# Patient Record
Sex: MALE | Race: WHITE | NOT HISPANIC OR LATINO | Employment: UNEMPLOYED | URBAN - NONMETROPOLITAN AREA
[De-identification: names, ages, dates, MRNs, and addresses within clinical notes are randomized per-mention and may not be internally consistent; named-entity substitution may affect disease eponyms.]

---
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
## 2020-01-03 ENCOUNTER — OFFICE VISIT (OUTPATIENT)
Dept: FAMILY MEDICINE CLINIC | Facility: CLINIC | Age: 20
End: 2020-01-03

## 2020-01-03 VITALS
DIASTOLIC BLOOD PRESSURE: 62 MMHG | BODY MASS INDEX: 26.22 KG/M2 | SYSTOLIC BLOOD PRESSURE: 100 MMHG | OXYGEN SATURATION: 97 % | HEIGHT: 69 IN | HEART RATE: 71 BPM | WEIGHT: 177 LBS

## 2020-01-03 DIAGNOSIS — R51.9 PERSISTENT HEADACHES: ICD-10-CM

## 2020-01-03 DIAGNOSIS — J30.9 ALLERGIC RHINITIS, UNSPECIFIED SEASONALITY, UNSPECIFIED TRIGGER: Primary | ICD-10-CM

## 2020-01-03 DIAGNOSIS — R41.3 MEMORY IMPAIRMENT: ICD-10-CM

## 2020-01-03 PROCEDURE — 99213 OFFICE O/P EST LOW 20 MIN: CPT | Performed by: NURSE PRACTITIONER

## 2020-01-06 PROBLEM — J30.9 ALLERGIC RHINITIS: Status: ACTIVE | Noted: 2020-01-06

## 2020-01-06 PROBLEM — R41.3 MEMORY IMPAIRMENT: Status: ACTIVE | Noted: 2020-01-06

## 2020-01-06 PROBLEM — R51.9 PERSISTENT HEADACHES: Status: ACTIVE | Noted: 2020-01-06

## 2020-01-06 NOTE — PROGRESS NOTES
"Subjective   Nigel Escobar is a 19 y.o. male who presents to the office complaining of nasal congestion and sore throat that is worse in the mornings.    Continues to attend college at [employer], will go back on the 13th of this month.  Admits that he's having trouble remembering things that have happened, says he has \"trouble putting them in order.\"  Does not remember if something happened the day before or a month ago.  Headaches are sporadic.  Started having episodes of \"his brain hurting\" last year.    Living with his aunt since he was very young.    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  History of Present Illness     The following portions of the patient's history were reviewed and updated as appropriate: allergies, current medications, past family history, past medical history, past social history, past surgical history and problem list.    Review of Systems   Constitutional: Negative for chills, fatigue and fever.   HENT: Positive for congestion and sore throat. Negative for sneezing and trouble swallowing.    Eyes: Negative for visual disturbance.   Respiratory: Negative for cough, chest tightness, shortness of breath and wheezing.    Cardiovascular: Negative for chest pain, palpitations and leg swelling.   Gastrointestinal: Negative for abdominal pain, constipation, diarrhea, nausea and vomiting.   Genitourinary: Negative for dysuria, frequency and urgency.   Musculoskeletal: Negative for neck pain.   Skin: Negative for rash.   Neurological: Positive for headaches. Negative for dizziness and weakness.   Psychiatric/Behavioral:        In the past two weeks the pt has not felt down, depressed, hopeless or lost interest in doing things   All other systems reviewed and are negative.      Objective "   Physical Exam   Constitutional: He is oriented to person, place, and time. He appears well-developed and well-nourished. He is cooperative.  Non-toxic appearance. He has a sickly appearance. No distress.   HENT:   Head: Normocephalic and atraumatic.   Right Ear: Tympanic membrane and external ear normal.   Left Ear: Tympanic membrane and external ear normal.   Nose: Mucosal edema and rhinorrhea present.   Mouth/Throat: Uvula is midline, oropharynx is clear and moist and mucous membranes are normal.   Eyes: Pupils are equal, round, and reactive to light. Conjunctivae, EOM and lids are normal. Right eye exhibits no discharge. Left eye exhibits no discharge. No scleral icterus.   Neck: Trachea normal, normal range of motion and phonation normal. Neck supple. No thyromegaly present.   Cardiovascular: Normal rate, regular rhythm, normal heart sounds and intact distal pulses. Exam reveals no gallop and no friction rub.   No murmur heard.  Pulmonary/Chest: Effort normal and breath sounds normal. No respiratory distress. He has no wheezes. He has no rales.   Abdominal: Soft. Bowel sounds are normal. He exhibits no distension. There is no tenderness. There is no rebound and no guarding.   Musculoskeletal: Normal range of motion. He exhibits no edema.   Lymphadenopathy:     He has no cervical adenopathy.   Neurological: He is alert and oriented to person, place, and time. No cranial nerve deficit. GCS eye subscore is 4. GCS verbal subscore is 5. GCS motor subscore is 6.   Skin: Skin is warm, dry and intact. Capillary refill takes less than 2 seconds. No rash noted.   Psychiatric: He has a normal mood and affect. His speech is normal and behavior is normal. Judgment and thought content normal. His mood appears not anxious. He does not exhibit a depressed mood. He expresses no homicidal and no suicidal ideation. He expresses no suicidal plans and no homicidal plans.   Nursing note and vitals reviewed.      Assessment/Plan    Nigel was seen today for nasal congestion and sore throat.    Diagnoses and all orders for this visit:    Allergic rhinitis, unspecified seasonality, unspecified trigger    Persistent headaches  -     CT Head Without Contrast; Future    Memory impairment  -     CT Head Without Contrast; Future    Other orders  -     loratadine-pseudoephedrine (LORATADINE-D 12HR) 5-120 MG per 12 hr tablet; Take 1 tablet by mouth Every Morning.    Encouraged medication as ordered.  Will obtain CT scan to r/o any pathology regarding his memory issues.

## 2020-06-01 DIAGNOSIS — F41.9 ANXIETY: ICD-10-CM

## 2020-06-01 RX ORDER — VENLAFAXINE HYDROCHLORIDE 75 MG/1
CAPSULE, EXTENDED RELEASE ORAL
Qty: 90 CAPSULE | Refills: 1 | Status: SHIPPED | OUTPATIENT
Start: 2020-06-01 | End: 2020-08-11 | Stop reason: SDUPTHER

## 2020-07-07 DIAGNOSIS — R00.2 PALPITATIONS: Primary | ICD-10-CM

## 2020-08-11 ENCOUNTER — OFFICE VISIT (OUTPATIENT)
Dept: FAMILY MEDICINE CLINIC | Facility: CLINIC | Age: 20
End: 2020-08-11

## 2020-08-11 VITALS
HEART RATE: 97 BPM | SYSTOLIC BLOOD PRESSURE: 110 MMHG | TEMPERATURE: 97.9 F | BODY MASS INDEX: 28.14 KG/M2 | HEIGHT: 69 IN | DIASTOLIC BLOOD PRESSURE: 70 MMHG | WEIGHT: 190 LBS | OXYGEN SATURATION: 99 %

## 2020-08-11 DIAGNOSIS — H57.89 RED EYE: ICD-10-CM

## 2020-08-11 DIAGNOSIS — F41.9 ANXIETY: ICD-10-CM
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
## 2020-08-11 PROCEDURE — 99213 OFFICE O/P EST LOW 20 MIN: CPT | Performed by: NURSE PRACTITIONER

## 2020-08-11 RX ORDER — LORATADINE 10 MG/1
10 TABLET ORAL DAILY
Qty: 30 TABLET | Refills: 5 | Status: SHIPPED | OUTPATIENT
Start: 2020-08-11 | End: 2022-04-22

## 2020-08-11 RX ORDER — VENLAFAXINE HYDROCHLORIDE 75 MG/1
CAPSULE, EXTENDED RELEASE ORAL
Qty: 90 CAPSULE | Refills: 3 | Status: SHIPPED | OUTPATIENT
Start: 2020-08-11 | End: 2021-06-21 | Stop reason: SDUPTHER
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
## 2020-08-30 PROBLEM — H57.89 RED EYE: Status: ACTIVE | Noted: 2020-08-30

## 2020-08-31 NOTE — PROGRESS NOTES
Subjective   Nigel Escobar is a 19 y.o. male who presents to the office complaining of rash from a spider bite on his RIGHT leg.  Is also having red eyes at times without eye discharge, itching or pain.  Will need a refill of Effexor that he takes for anxiety as he can tell a lessening of his anxiety.  Continues to attend [employer].  History of Present Illness     The following portions of the patient's history were reviewed and updated as appropriate: allergies, current medications, past family history, past medical history, past social history, past surgical history and problem list.    Review of Systems   Constitutional: Negative for chills, fatigue and fever.   HENT: Negative for congestion, sneezing, sore throat and trouble swallowing.    Eyes: Positive for redness. Negative for visual disturbance.   Respiratory: Negative for cough, chest tightness, shortness of breath and wheezing.    Cardiovascular: Negative for chest pain, palpitations and leg swelling.   Gastrointestinal: Negative for abdominal pain, constipation, diarrhea, nausea and vomiting.   Genitourinary: Negative for dysuria, frequency and urgency.   Musculoskeletal: Negative for neck pain.   Skin: Positive for rash.   Neurological: Negative for dizziness, weakness and headaches.   Psychiatric/Behavioral: The patient is nervous/anxious.         In the past two weeks the pt has not felt down, depressed, hopeless or lost interest in doing things   All other systems reviewed and are negative.      Objective   Physical Exam   Constitutional: He is oriented to person, place, and time. He appears well-developed and well-nourished. He is cooperative.  Non-toxic appearance. No distress.   HENT:   Head: Normocephalic and atraumatic.   Right Ear: External ear normal.   Left Ear: External ear normal.   Nose: Nose normal.   Mouth/Throat: Oropharynx is clear and moist.   Eyes: Pupils are equal, round, and reactive to light. Conjunctivae, EOM and lids are  normal. Right eye exhibits no discharge. Left eye exhibits no discharge. No scleral icterus.   Bilat red eyes   Neck: Normal range of motion. Neck supple. No thyromegaly present.   Cardiovascular: Normal rate, regular rhythm, normal heart sounds and intact distal pulses. Exam reveals no gallop and no friction rub.   No murmur heard.  Pulmonary/Chest: Effort normal and breath sounds normal. No respiratory distress. He has no wheezes. He has no rales.   Abdominal: Soft. Bowel sounds are normal. He exhibits no distension. There is no tenderness. There is no rebound and no guarding.   Musculoskeletal: Normal range of motion. He exhibits no edema.   Lymphadenopathy:     He has no cervical adenopathy.   Neurological: He is alert and oriented to person, place, and time. No cranial nerve deficit. GCS eye subscore is 4. GCS verbal subscore is 5. GCS motor subscore is 6.   Skin: Skin is warm, dry and intact. Capillary refill takes less than 2 seconds. No rash noted.   Psychiatric: He has a normal mood and affect. His speech is normal and behavior is normal. Judgment and thought content normal. His mood appears not anxious. Cognition and memory are normal. He does not exhibit a depressed mood. He expresses no homicidal and no suicidal ideation. He expresses no suicidal plans and no homicidal plans.   Nursing note and vitals reviewed.      Assessment/Plan   Nigel was seen today for rash and anxiety.    Diagnoses and all orders for this visit:    Insect bite, unspecified site, initial encounter    Anxiety  -     venlafaxine XR (EFFEXOR-XR) 75 MG 24 hr capsule; TAKE 1 CAPSULE BY MOUTH DAILY    Red eye    Other orders  -     loratadine (CLARITIN) 10 MG tablet; Take 1 tablet by mouth Daily.  -     hydrocortisone 2.5 % ointment; Apply  topically to the appropriate area as directed 3 (Three) Times a Day.    Encouraged to continue with medications as ordered.  Good handwashing.  Use Visine Red Eye prn.

## 2021-05-03 RX ORDER — ALBUTEROL SULFATE 90 UG/1
2 AEROSOL, METERED RESPIRATORY (INHALATION)
Qty: 18 G | Refills: 2 | Status: SHIPPED | OUTPATIENT
Start: 2021-05-03

## 2021-06-11 DIAGNOSIS — Z00.00 ADULT GENERAL MEDICAL EXAM: Primary | ICD-10-CM

## 2021-06-17 ENCOUNTER — LAB (OUTPATIENT)
Dept: LAB | Facility: OTHER | Age: 21
End: 2021-06-17

## 2021-06-17 DIAGNOSIS — Z00.00 ADULT GENERAL MEDICAL EXAM: ICD-10-CM

## 2021-06-17 LAB
ALBUMIN SERPL-MCNC: 4.6 G/DL (ref 3.5–5)
ALBUMIN/GLOB SERPL: 1.4 G/DL (ref 1.1–1.8)
ALP SERPL-CCNC: 88 U/L (ref 38–126)
ALT SERPL W P-5'-P-CCNC: 79 U/L
ANION GAP SERPL CALCULATED.3IONS-SCNC: 7 MMOL/L (ref 5–15)
AST SERPL-CCNC: 40 U/L (ref 17–59)
BASOPHILS # BLD AUTO: 0.05 10*3/MM3 (ref 0–0.2)
BASOPHILS NFR BLD AUTO: 0.6 % (ref 0–1.5)
BILIRUB SERPL-MCNC: 1 MG/DL (ref 0.2–1.3)
BUN SERPL-MCNC: 16 MG/DL (ref 7–23)
BUN/CREAT SERPL: 15.8 (ref 7–25)
CALCIUM SPEC-SCNC: 9.8 MG/DL (ref 8.4–10.2)
CHLORIDE SERPL-SCNC: 102 MMOL/L (ref 101–112)
CHOLEST SERPL-MCNC: 165 MG/DL (ref 150–200)
CO2 SERPL-SCNC: 31 MMOL/L (ref 22–30)
CREAT SERPL-MCNC: 1.01 MG/DL (ref 0.7–1.3)
DEPRECATED RDW RBC AUTO: 36.7 FL (ref 37–54)
EOSINOPHIL # BLD AUTO: 0.15 10*3/MM3 (ref 0–0.4)
EOSINOPHIL NFR BLD AUTO: 1.7 % (ref 0.3–6.2)
ERYTHROCYTE [DISTWIDTH] IN BLOOD BY AUTOMATED COUNT: 12.5 % (ref 12.3–15.4)
GFR SERPL CREATININE-BSD FRML MDRD: 94 ML/MIN/1.73 (ref 77–179)
GLOBULIN UR ELPH-MCNC: 3.2 GM/DL (ref 2.3–3.5)
GLUCOSE SERPL-MCNC: 96 MG/DL (ref 70–99)
HCT VFR BLD AUTO: 46 % (ref 37.5–51)
HDLC SERPL-MCNC: 59 MG/DL (ref 40–59)
HGB BLD-MCNC: 16.1 G/DL (ref 13–17.7)
LDLC SERPL CALC-MCNC: 84 MG/DL
LDLC/HDLC SERPL: 1.38 {RATIO} (ref 0–3.55)
LYMPHOCYTES # BLD AUTO: 2.79 10*3/MM3 (ref 0.7–3.1)
LYMPHOCYTES NFR BLD AUTO: 31.6 % (ref 19.6–45.3)
MCH RBC QN AUTO: 28.6 PG (ref 26.6–33)
MCHC RBC AUTO-ENTMCNC: 35 G/DL (ref 31.5–35.7)
MCV RBC AUTO: 81.7 FL (ref 79–97)
MONOCYTES # BLD AUTO: 0.96 10*3/MM3 (ref 0.1–0.9)
MONOCYTES NFR BLD AUTO: 10.9 % (ref 5–12)
NEUTROPHILS NFR BLD AUTO: 4.87 10*3/MM3 (ref 1.7–7)
NEUTROPHILS NFR BLD AUTO: 55.2 % (ref 42.7–76)
PLATELET # BLD AUTO: 419 10*3/MM3 (ref 140–450)
PMV BLD AUTO: 9.4 FL (ref 6–12)
POTASSIUM SERPL-SCNC: 4 MMOL/L (ref 3.4–5)
PROT SERPL-MCNC: 7.8 G/DL (ref 6.3–8.6)
RBC # BLD AUTO: 5.63 10*6/MM3 (ref 4.14–5.8)
SODIUM SERPL-SCNC: 140 MMOL/L (ref 137–145)
T4 FREE SERPL-MCNC: 1.35 NG/DL (ref 0.93–1.7)
TRIGL SERPL-MCNC: 122 MG/DL
TSH SERPL DL<=0.05 MIU/L-ACNC: 2.69 UIU/ML (ref 0.27–4.2)
VLDLC SERPL-MCNC: 22 MG/DL (ref 5–40)
WBC # BLD AUTO: 8.82 10*3/MM3 (ref 3.4–10.8)

## 2021-06-17 PROCEDURE — 84443 ASSAY THYROID STIM HORMONE: CPT | Performed by: NURSE PRACTITIONER

## 2021-06-17 PROCEDURE — 85025 COMPLETE CBC W/AUTO DIFF WBC: CPT | Performed by: NURSE PRACTITIONER

## 2021-06-17 PROCEDURE — 80061 LIPID PANEL: CPT | Performed by: NURSE PRACTITIONER

## 2021-06-17 PROCEDURE — 84439 ASSAY OF FREE THYROXINE: CPT | Performed by: NURSE PRACTITIONER

## 2021-06-17 PROCEDURE — 80053 COMPREHEN METABOLIC PANEL: CPT | Performed by: NURSE PRACTITIONER

## 2021-06-21 DIAGNOSIS — R74.8 ELEVATED LIVER ENZYMES: Primary | ICD-10-CM

## 2021-06-21 DIAGNOSIS — F41.9 ANXIETY: ICD-10-CM

## 2021-06-22 RX ORDER — VENLAFAXINE HYDROCHLORIDE 75 MG/1
CAPSULE, EXTENDED RELEASE ORAL
Qty: 90 CAPSULE | Refills: 3 | Status: SHIPPED | OUTPATIENT
Start: 2021-06-22 | End: 2022-04-22

## 2021-08-09 ENCOUNTER — LAB (OUTPATIENT)
Dept: LAB | Facility: OTHER | Age: 21
End: 2021-08-09

## 2021-08-09 DIAGNOSIS — R74.8 ELEVATED LIVER ENZYMES: ICD-10-CM

## 2021-08-09 LAB
ALBUMIN SERPL-MCNC: 4.4 G/DL (ref 3.5–5)
ALBUMIN/GLOB SERPL: 1.5 G/DL (ref 1.1–1.8)
ALP SERPL-CCNC: 79 U/L (ref 38–126)
ALT SERPL W P-5'-P-CCNC: 80 U/L
ANION GAP SERPL CALCULATED.3IONS-SCNC: 5 MMOL/L (ref 5–15)
AST SERPL-CCNC: 42 U/L (ref 17–59)
BILIRUB SERPL-MCNC: 0.7 MG/DL (ref 0.2–1.3)
BUN SERPL-MCNC: 15 MG/DL (ref 7–23)
BUN/CREAT SERPL: 15.8 (ref 7–25)
CALCIUM SPEC-SCNC: 9.5 MG/DL (ref 8.4–10.2)
CHLORIDE SERPL-SCNC: 105 MMOL/L (ref 101–112)
CO2 SERPL-SCNC: 29 MMOL/L (ref 22–30)
CREAT SERPL-MCNC: 0.95 MG/DL (ref 0.7–1.3)
GFR SERPL CREATININE-BSD FRML MDRD: 101 ML/MIN/1.73 (ref 77–179)
GLOBULIN UR ELPH-MCNC: 3 GM/DL (ref 2.3–3.5)
GLUCOSE SERPL-MCNC: 93 MG/DL (ref 70–99)
POTASSIUM SERPL-SCNC: 4.4 MMOL/L (ref 3.4–5)
PROT SERPL-MCNC: 7.4 G/DL (ref 6.3–8.6)
SODIUM SERPL-SCNC: 139 MMOL/L (ref 137–145)

## 2021-08-09 PROCEDURE — 80053 COMPREHEN METABOLIC PANEL: CPT | Performed by: NURSE PRACTITIONER

## 2021-08-30 DIAGNOSIS — R74.01 ELEVATED ALT MEASUREMENT: Primary | ICD-10-CM

## 2021-09-03 ENCOUNTER — LAB (OUTPATIENT)
Dept: LAB | Facility: OTHER | Age: 21
End: 2021-09-03

## 2021-09-03 DIAGNOSIS — R74.01 ELEVATED ALT MEASUREMENT: ICD-10-CM

## 2021-09-03 LAB
ALBUMIN SERPL-MCNC: 4.3 G/DL (ref 3.5–5)
ALP SERPL-CCNC: 77 U/L (ref 38–126)
ALT SERPL W P-5'-P-CCNC: 68 U/L
AST SERPL-CCNC: 41 U/L (ref 17–59)
BILIRUB CONJ SERPL-MCNC: <0 MG/DL (ref 0–0.3)
BILIRUB INDIRECT SERPL-MCNC: 0.4 MG/DL (ref 0–1.1)
BILIRUB SERPL-MCNC: 0.5 MG/DL (ref 0.2–1.3)
HAV IGM SERPL QL IA: NORMAL
HBV CORE IGM SERPL QL IA: NORMAL
HBV SURFACE AG SERPL QL IA: NORMAL
HCV AB SER DONR QL: NORMAL
PROT SERPL-MCNC: 7.3 G/DL (ref 6.3–8.6)

## 2021-09-03 PROCEDURE — 80076 HEPATIC FUNCTION PANEL: CPT | Performed by: NURSE PRACTITIONER

## 2021-09-03 PROCEDURE — 80074 ACUTE HEPATITIS PANEL: CPT | Performed by: NURSE PRACTITIONER

## 2022-03-23 ENCOUNTER — LAB (OUTPATIENT)
Dept: LAB | Facility: OTHER | Age: 22
End: 2022-03-23

## 2022-03-23 DIAGNOSIS — R74.8 ELEVATED LIVER ENZYMES: Primary | ICD-10-CM

## 2022-03-23 DIAGNOSIS — R74.8 ELEVATED LIVER ENZYMES: ICD-10-CM

## 2022-03-23 LAB
ALBUMIN SERPL-MCNC: 4.2 G/DL (ref 3.5–5)
ALBUMIN/GLOB SERPL: 1.4 G/DL (ref 1.1–1.8)
ALP SERPL-CCNC: 74 U/L (ref 38–126)
ALT SERPL W P-5'-P-CCNC: 145 U/L
ANION GAP SERPL CALCULATED.3IONS-SCNC: 6 MMOL/L (ref 5–15)
AST SERPL-CCNC: 79 U/L (ref 17–59)
BASOPHILS # BLD AUTO: 0.03 10*3/MM3 (ref 0–0.2)
BASOPHILS NFR BLD AUTO: 0.4 % (ref 0–1.5)
BILIRUB CONJ SERPL-MCNC: <0 MG/DL (ref 0–0.3)
BILIRUB SERPL-MCNC: 0.6 MG/DL (ref 0.2–1.3)
BILIRUB UR QL STRIP: NEGATIVE
BUN SERPL-MCNC: 14 MG/DL (ref 7–23)
BUN/CREAT SERPL: 15.9 (ref 7–25)
CALCIUM SPEC-SCNC: 9.2 MG/DL (ref 8.4–10.2)
CHLORIDE SERPL-SCNC: 102 MMOL/L (ref 101–112)
CLARITY UR: CLEAR
CO2 SERPL-SCNC: 30 MMOL/L (ref 22–30)
COLOR UR: YELLOW
CREAT SERPL-MCNC: 0.88 MG/DL (ref 0.7–1.3)
DEPRECATED RDW RBC AUTO: 37.9 FL (ref 37–54)
EGFRCR SERPLBLD CKD-EPI 2021: 125.5 ML/MIN/1.73
EOSINOPHIL # BLD AUTO: 0.16 10*3/MM3 (ref 0–0.4)
EOSINOPHIL NFR BLD AUTO: 2.3 % (ref 0.3–6.2)
ERYTHROCYTE [DISTWIDTH] IN BLOOD BY AUTOMATED COUNT: 12.9 % (ref 12.3–15.4)
GLOBULIN UR ELPH-MCNC: 3 GM/DL (ref 2.3–3.5)
GLUCOSE SERPL-MCNC: 91 MG/DL (ref 70–99)
GLUCOSE UR STRIP-MCNC: NEGATIVE MG/DL
HCT VFR BLD AUTO: 44.7 % (ref 37.5–51)
HGB BLD-MCNC: 15.3 G/DL (ref 13–17.7)
HGB UR QL STRIP.AUTO: NEGATIVE
KETONES UR QL STRIP: NEGATIVE
LEUKOCYTE ESTERASE UR QL STRIP.AUTO: NEGATIVE
LYMPHOCYTES # BLD AUTO: 2.17 10*3/MM3 (ref 0.7–3.1)
LYMPHOCYTES NFR BLD AUTO: 31.7 % (ref 19.6–45.3)
MCH RBC QN AUTO: 27.8 PG (ref 26.6–33)
MCHC RBC AUTO-ENTMCNC: 34.2 G/DL (ref 31.5–35.7)
MCV RBC AUTO: 81.3 FL (ref 79–97)
MONOCYTES # BLD AUTO: 0.78 10*3/MM3 (ref 0.1–0.9)
MONOCYTES NFR BLD AUTO: 11.4 % (ref 5–12)
NEUTROPHILS NFR BLD AUTO: 3.71 10*3/MM3 (ref 1.7–7)
NEUTROPHILS NFR BLD AUTO: 54.2 % (ref 42.7–76)
NITRITE UR QL STRIP: NEGATIVE
PH UR STRIP.AUTO: 5.5 [PH] (ref 5.5–8)
PLATELET # BLD AUTO: 374 10*3/MM3 (ref 140–450)
PMV BLD AUTO: 9.7 FL (ref 6–12)
POTASSIUM SERPL-SCNC: 4 MMOL/L (ref 3.4–5)
PROT SERPL-MCNC: 7.2 G/DL (ref 6.3–8.6)
PROT UR QL STRIP: NEGATIVE
RBC # BLD AUTO: 5.5 10*6/MM3 (ref 4.14–5.8)
SODIUM SERPL-SCNC: 138 MMOL/L (ref 137–145)
SP GR UR STRIP: 1.02 (ref 1–1.03)
UROBILINOGEN UR QL STRIP: NORMAL
WBC NRBC COR # BLD: 6.85 10*3/MM3 (ref 3.4–10.8)

## 2022-03-23 PROCEDURE — 80053 COMPREHEN METABOLIC PANEL: CPT | Performed by: NURSE PRACTITIONER

## 2022-03-23 PROCEDURE — 82248 BILIRUBIN DIRECT: CPT | Performed by: NURSE PRACTITIONER

## 2022-03-23 PROCEDURE — 85025 COMPLETE CBC W/AUTO DIFF WBC: CPT | Performed by: NURSE PRACTITIONER

## 2022-03-23 PROCEDURE — 81003 URINALYSIS AUTO W/O SCOPE: CPT | Performed by: NURSE PRACTITIONER

## 2022-04-04 DIAGNOSIS — R74.8 ELEVATED LIVER ENZYMES: Primary | ICD-10-CM

## 2022-04-22 ENCOUNTER — OFFICE VISIT (OUTPATIENT)
Dept: GASTROENTEROLOGY | Facility: CLINIC | Age: 22
End: 2022-04-22

## 2022-04-22 VITALS
HEIGHT: 69 IN | WEIGHT: 204.8 LBS | SYSTOLIC BLOOD PRESSURE: 110 MMHG | DIASTOLIC BLOOD PRESSURE: 70 MMHG | BODY MASS INDEX: 30.33 KG/M2 | HEART RATE: 108 BPM

## 2022-04-22 DIAGNOSIS — R79.89 ELEVATED LIVER FUNCTION TESTS: Primary | ICD-10-CM

## 2022-04-22 PROCEDURE — 99204 OFFICE O/P NEW MOD 45 MIN: CPT | Performed by: INTERNAL MEDICINE

## 2022-04-22 NOTE — PROGRESS NOTES
[facility]      Chief Complaint:   Chief Complaint   Patient presents with   • Elevated Hepatic Enzymes       Subjective     HPI:   Patient with new onset elevation of liver enzymes over the past year which have slightly worsened.  Patient has gained approximately 40 pounds over that period of time.  Patient denies any alcohol use.  Patient denies any abdominal pain nausea vomiting change in bowel habits.    Plan; will order ultrasound of the abdomen and lab work including CMP CBC hepatitis profile and liver work-up.  Let patient follow-up in 4 weeks    Past Medical History:   Past Medical History:   Diagnosis Date   • Acne vulgaris    • Allergic    • Asthma    • Constipation     unspecified   • Diarrhea    • Gastroesophageal reflux disease    • Generalized anxiety disorder    • Impacted cerumen    • Seborrheic dermatitis    • Upper respiratory infection        Past Surgical History:  Past Surgical History:   Procedure Laterality Date   • WISDOM TOOTH EXTRACTION  04/2019       Family History:  Family History   Problem Relation Age of Onset   • Diabetes Mother    • Depression Mother    • Bipolar disorder Mother    • Cancer Maternal Uncle         colon   • Cancer Paternal Aunt    • Developmental Disability Paternal Aunt    • Hypertension Paternal Aunt    • Diabetes Father    • Heart disease Maternal Grandmother    • Cancer Maternal Grandmother    • Heart disease Maternal Grandfather        Social History:   reports that he has never smoked. He has never used smokeless tobacco. He reports that he does not drink alcohol and does not use drugs.    Medications:   Prior to Admission medications    Medication Sig Start Date End Date Taking? Authorizing Provider   albuterol sulfate HFA (Ventolin HFA) 108 (90 Base) MCG/ACT inhaler Inhale 2 puffs 4 (Four) Times a Day.  Patient taking differently: Inhale 2 puffs 4 (Four) Times a Day As Needed. 5/3/21  Yes Aylin Gardner APRN   hydrocortisone 2.5 %  "ointment Apply  topically to the appropriate area as directed 3 (Three) Times a Day. 8/11/20 4/22/22  Aylin Gardner APRN   loratadine (CLARITIN) 10 MG tablet Take 1 tablet by mouth Daily. 8/11/20 4/22/22  yAlin Gardner APRN   loratadine-pseudoephedrine (LORATADINE-D 12HR) 5-120 MG per 12 hr tablet Take 1 tablet by mouth Every Morning. 1/3/20 4/22/22  Aylin Gardner APRN   venlafaxine XR (EFFEXOR-XR) 75 MG 24 hr capsule TAKE 1 CAPSULE BY MOUTH DAILY 6/22/21 4/22/22  Aylin Gardner APRN       Allergies:  Patient has no known allergies.    ROS:    Review of Systems   Constitutional: Negative for activity change, appetite change and unexpected weight change.   HENT: Negative for congestion, sore throat and trouble swallowing.    Respiratory: Negative for cough, choking and shortness of breath.    Cardiovascular: Negative for chest pain.   Gastrointestinal: Negative for abdominal distention, abdominal pain, anal bleeding, blood in stool, constipation, diarrhea, nausea, rectal pain and vomiting.   Endocrine: Negative for heat intolerance, polydipsia and polyphagia.   Genitourinary: Negative for difficulty urinating.   Musculoskeletal: Negative for arthralgias.   Skin: Negative for color change, pallor, rash and wound.   Allergic/Immunologic: Negative for food allergies.   Neurological: Negative for dizziness, syncope, weakness and headaches.   Psychiatric/Behavioral: Negative for agitation, behavioral problems, confusion and decreased concentration.     Objective     Blood pressure 110/70, pulse 108, height 175.3 cm (69\"), weight 92.9 kg (204 lb 12.8 oz).    Physical Exam  Constitutional:       General: He is not in acute distress.     Appearance: He is well-developed. He is not diaphoretic.   HENT:      Head: Normocephalic and atraumatic.   Cardiovascular:      Rate and Rhythm: Normal rate and regular rhythm.      Heart sounds: Normal heart sounds. No murmur heard.    No friction rub. No gallop.   Pulmonary:    "   Effort: No respiratory distress.      Breath sounds: Normal breath sounds. No wheezing or rales.   Chest:      Chest wall: No tenderness.   Abdominal:      General: Bowel sounds are normal. There is no distension.      Palpations: Abdomen is soft. There is no mass.      Tenderness: There is no abdominal tenderness. There is no guarding or rebound.      Hernia: No hernia is present.   Musculoskeletal:         General: Normal range of motion.   Skin:     General: Skin is warm and dry.      Coloration: Skin is not pale.      Findings: No erythema or rash.   Neurological:      Mental Status: He is alert and oriented to person, place, and time.   Psychiatric:         Behavior: Behavior normal.         Thought Content: Thought content normal.         Judgment: Judgment normal.          Assessment/Plan   Diagnoses and all orders for this visit:    1. Elevated liver function tests (Primary)  -     Comprehensive Metabolic Panel; Future  -     CBC & Differential; Future  -     Alpha - 1 - Antitrypsin; Future  -     ALFONSO; Future  -     Anti-Smooth Muscle Antibody Titer; Future  -     Ceruloplasmin; Future  -     Hepatitis Panel, Acute; Future  -     Mitochondrial Antibodies, M2; Future  -     US Abdomen Complete; Future  -     Iron Profile; Future  -     AFP Tumor Marker; Future        * Surgery not found *     Diagnosis Plan   1. Elevated liver function tests  Comprehensive Metabolic Panel    CBC & Differential    Alpha - 1 - Antitrypsin    ALFONSO    Anti-Smooth Muscle Antibody Titer    Ceruloplasmin    Hepatitis Panel, Acute    Mitochondrial Antibodies, M2    US Abdomen Complete    Iron Profile    AFP Tumor Marker       Anticipated Surgical Procedure:  Orders Placed This Encounter   Procedures   • US Abdomen Complete     Standing Status:   Future     Order Specific Question:   Reason for Exam:     Answer:   elevated liver enzy   • Comprehensive Metabolic Panel     Standing Status:   Future     Order Specific Question:    Release to patient     Answer:   Immediate   • Alpha - 1 - Antitrypsin     Standing Status:   Future     Order Specific Question:   Release to patient     Answer:   Immediate   • ALFONSO     Standing Status:   Future     Order Specific Question:   Release to patient     Answer:   Immediate   • Anti-Smooth Muscle Antibody Titer     Standing Status:   Future     Order Specific Question:   Release to patient     Answer:   Immediate   • Ceruloplasmin     Standing Status:   Future     Order Specific Question:   Release to patient     Answer:   Immediate   • Hepatitis Panel, Acute     Standing Status:   Future     Order Specific Question:   Release to patient     Answer:   Immediate   • Mitochondrial Antibodies, M2     Standing Status:   Future     Order Specific Question:   Release to patient     Answer:   Immediate   • Iron Profile     Standing Status:   Future   • AFP Tumor Marker     Standing Status:   Future     Order Specific Question:   Release to patient     Answer:   Immediate   • CBC & Differential     Standing Status:   Future     Order Specific Question:   Manual Differential     Answer:   No       The risks, benefits, and alternatives of this procedure have been discussed with the patient or the responsible party- the patient understands and agrees to proceed.

## 2022-04-29 ENCOUNTER — LAB (OUTPATIENT)
Dept: LAB | Facility: HOSPITAL | Age: 22
End: 2022-04-29

## 2022-04-29 ENCOUNTER — HOSPITAL ENCOUNTER (OUTPATIENT)
Dept: ULTRASOUND IMAGING | Facility: HOSPITAL | Age: 22
Discharge: HOME | End: 2022-04-29

## 2022-04-29 DIAGNOSIS — R79.89 ELEVATED LIVER FUNCTION TESTS: ICD-10-CM

## 2022-04-29 LAB
ALBUMIN SERPL-MCNC: 4.5 G/DL (ref 3.5–5.2)
ALBUMIN/GLOB SERPL: 1.5 G/DL
ALP SERPL-CCNC: 77 U/L (ref 39–117)
ALPHA-FETOPROTEIN: 1.82 NG/ML (ref 0–8.3)
ALPHA1 GLOB MFR UR ELPH: 117.6 MG/DL (ref 90–200)
ALT SERPL W P-5'-P-CCNC: 83 U/L (ref 1–41)
ANION GAP SERPL CALCULATED.3IONS-SCNC: 11.6 MMOL/L (ref 5–15)
AST SERPL-CCNC: 36 U/L (ref 1–40)
BASOPHILS # BLD AUTO: 0.03 10*3/MM3 (ref 0–0.2)
BASOPHILS NFR BLD AUTO: 0.5 % (ref 0–1.5)
BILIRUB SERPL-MCNC: 0.5 MG/DL (ref 0–1.2)
BUN SERPL-MCNC: 14 MG/DL (ref 6–20)
BUN/CREAT SERPL: 16.1 (ref 7–25)
CALCIUM SPEC-SCNC: 9.4 MG/DL (ref 8.6–10.5)
CERULOPLASMIN SERPL-MCNC: 20 MG/DL (ref 16–31)
CHLORIDE SERPL-SCNC: 102 MMOL/L (ref 98–107)
CO2 SERPL-SCNC: 25.4 MMOL/L (ref 22–29)
CREAT SERPL-MCNC: 0.87 MG/DL (ref 0.76–1.27)
DEPRECATED RDW RBC AUTO: 36.8 FL (ref 37–54)
EGFRCR SERPLBLD CKD-EPI 2021: 125.9 ML/MIN/1.73
EOSINOPHIL # BLD AUTO: 0.11 10*3/MM3 (ref 0–0.4)
EOSINOPHIL NFR BLD AUTO: 2 % (ref 0.3–6.2)
ERYTHROCYTE [DISTWIDTH] IN BLOOD BY AUTOMATED COUNT: 12.6 % (ref 12.3–15.4)
GLOBULIN UR ELPH-MCNC: 3 GM/DL
GLUCOSE SERPL-MCNC: 85 MG/DL (ref 65–99)
HAV IGM SERPL QL IA: NORMAL
HBV CORE IGM SERPL QL IA: NORMAL
HBV SURFACE AG SERPL QL IA: NORMAL
HCT VFR BLD AUTO: 45 % (ref 37.5–51)
HCV AB SER DONR QL: NORMAL
HGB BLD-MCNC: 15.4 G/DL (ref 13–17.7)
IMM GRANULOCYTES # BLD AUTO: 0.02 10*3/MM3 (ref 0–0.05)
IMM GRANULOCYTES NFR BLD AUTO: 0.4 % (ref 0–0.5)
IRON 24H UR-MRATE: 82 MCG/DL (ref 59–158)
IRON SATN MFR SERPL: 18 % (ref 20–50)
LYMPHOCYTES # BLD AUTO: 1.45 10*3/MM3 (ref 0.7–3.1)
LYMPHOCYTES NFR BLD AUTO: 26 % (ref 19.6–45.3)
MCH RBC QN AUTO: 27.7 PG (ref 26.6–33)
MCHC RBC AUTO-ENTMCNC: 34.2 G/DL (ref 31.5–35.7)
MCV RBC AUTO: 80.9 FL (ref 79–97)
MONOCYTES # BLD AUTO: 0.6 10*3/MM3 (ref 0.1–0.9)
MONOCYTES NFR BLD AUTO: 10.8 % (ref 5–12)
NEUTROPHILS NFR BLD AUTO: 3.36 10*3/MM3 (ref 1.7–7)
NEUTROPHILS NFR BLD AUTO: 60.3 % (ref 42.7–76)
NRBC BLD AUTO-RTO: 0 /100 WBC (ref 0–0.2)
PLATELET # BLD AUTO: 389 10*3/MM3 (ref 140–450)
PMV BLD AUTO: 10.7 FL (ref 6–12)
POTASSIUM SERPL-SCNC: 4.4 MMOL/L (ref 3.5–5.2)
PROT SERPL-MCNC: 7.5 G/DL (ref 6–8.5)
RBC # BLD AUTO: 5.56 10*6/MM3 (ref 4.14–5.8)
SODIUM SERPL-SCNC: 139 MMOL/L (ref 136–145)
TIBC SERPL-MCNC: 454 MCG/DL (ref 298–536)
TRANSFERRIN SERPL-MCNC: 305 MG/DL (ref 200–360)
WBC NRBC COR # BLD: 5.57 10*3/MM3 (ref 3.4–10.8)

## 2022-04-29 PROCEDURE — 86038 ANTINUCLEAR ANTIBODIES: CPT

## 2022-04-29 PROCEDURE — 80053 COMPREHEN METABOLIC PANEL: CPT

## 2022-04-29 PROCEDURE — 83540 ASSAY OF IRON: CPT

## 2022-04-29 PROCEDURE — 86015 ACTIN ANTIBODY EACH: CPT

## 2022-04-29 PROCEDURE — 82390 ASSAY OF CERULOPLASMIN: CPT

## 2022-04-29 PROCEDURE — 84466 ASSAY OF TRANSFERRIN: CPT

## 2022-04-29 PROCEDURE — 82105 ALPHA-FETOPROTEIN SERUM: CPT

## 2022-04-29 PROCEDURE — 85025 COMPLETE CBC W/AUTO DIFF WBC: CPT

## 2022-04-29 PROCEDURE — 76700 US EXAM ABDOM COMPLETE: CPT

## 2022-04-29 PROCEDURE — 86381 MITOCHONDRIAL ANTIBODY EACH: CPT

## 2022-04-29 PROCEDURE — 82103 ALPHA-1-ANTITRYPSIN TOTAL: CPT

## 2022-04-29 PROCEDURE — 80074 ACUTE HEPATITIS PANEL: CPT

## 2022-04-29 PROCEDURE — 36415 COLL VENOUS BLD VENIPUNCTURE: CPT

## 2022-04-30 LAB
MITOCHONDRIA M2 IGG SER-ACNC: <20 UNITS (ref 0–20)
SMA IGG SER-ACNC: 5 UNITS (ref 0–19)

## 2022-05-02 LAB — ANA SER QL: NEGATIVE

## 2022-06-02 ENCOUNTER — OFFICE VISIT (OUTPATIENT)
Dept: GASTROENTEROLOGY | Facility: CLINIC | Age: 22
End: 2022-06-02

## 2022-06-02 VITALS
HEART RATE: 77 BPM | WEIGHT: 201.8 LBS | SYSTOLIC BLOOD PRESSURE: 128 MMHG | DIASTOLIC BLOOD PRESSURE: 64 MMHG | BODY MASS INDEX: 29.89 KG/M2 | HEIGHT: 69 IN

## 2022-06-02 DIAGNOSIS — K75.81 NASH (NONALCOHOLIC STEATOHEPATITIS): Primary | ICD-10-CM

## 2022-06-02 PROCEDURE — 99214 OFFICE O/P EST MOD 30 MIN: CPT | Performed by: INTERNAL MEDICINE

## 2022-06-02 NOTE — PROGRESS NOTES
[facility]      Chief Complaint:   Chief Complaint   Patient presents with   • Follow-up     Lab and U/S results       Subjective     HPI:   Patient with elevated LFTs.  Patient for follow-up patient states that he feels well at this time.  Patient has been watching his diet and exercising patient has improved his LFTs some ultrasound of the abdomen shows fatty infiltrate in the liver.  Patient denies any nausea vomiting or diarrhea.  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    Plan; have patient follow-up in 3 months with lab work.    Past Medical History:   Past Medical History:   Diagnosis Date   • Acne vulgaris    • Allergic    • Asthma    • Constipation     unspecified   • Diarrhea    • Gastroesophageal reflux disease    • Generalized anxiety disorder    • Impacted cerumen    • Seborrheic dermatitis    • Upper respiratory infection        Past Surgical History:    Past Surgical History:   Procedure Laterality Date   • WISDOM TOOTH EXTRACTION  04/2019       Family History:  Family History   Problem Relation Age of Onset   • Diabetes Mother    • Depression Mother    • Bipolar disorder Mother    • Cancer Maternal Uncle         colon   • Cancer Paternal Aunt    • Developmental Disability Paternal Aunt    • Hypertension Paternal Aunt    • Diabetes Father    • Heart disease Maternal Grandmother    • Cancer Maternal Grandmother    • Heart disease Maternal Grandfather        Social History:   reports that he has never smoked. He has never used smokeless tobacco. He reports that he does not drink alcohol and does not use drugs.    Medications:   Prior to Admission medications    Medication Sig Start Date End Date Taking? Authorizing Provider   albuterol sulfate HFA (Ventolin HFA) 108 (90 Base) MCG/ACT inhaler Inhale 2 puffs 4 (Four) Times a Day.  Patient  "taking differently: Inhale 2 puffs 4 (Four) Times a Day As Needed. 5/3/21  Yes Aylin Gardner APRN       Allergies:  Patient has no known allergies.    ROS:    Review of Systems   Constitutional: Negative for activity change, appetite change and unexpected weight change.   HENT: Negative for congestion, sore throat and trouble swallowing.    Respiratory: Negative for cough, choking and shortness of breath.    Cardiovascular: Negative for chest pain.   Gastrointestinal: Negative for abdominal distention, abdominal pain, anal bleeding, blood in stool, constipation, diarrhea, nausea, rectal pain and vomiting.   Endocrine: Negative for heat intolerance, polydipsia and polyphagia.   Genitourinary: Negative for difficulty urinating.   Musculoskeletal: Negative for arthralgias.   Skin: Negative for color change, pallor, rash and wound.   Allergic/Immunologic: Negative for food allergies.   Neurological: Negative for dizziness, syncope, weakness and headaches.   Psychiatric/Behavioral: Negative for agitation, behavioral problems, confusion and decreased concentration.     Objective     Blood pressure 128/64, pulse 77, height 175.3 cm (69\"), weight 91.5 kg (201 lb 12.8 oz).    Physical Exam  Constitutional:       General: He is not in acute distress.     Appearance: He is well-developed. He is not diaphoretic.   HENT:      Head: Normocephalic and atraumatic.   Cardiovascular:      Rate and Rhythm: Normal rate and regular rhythm.      Heart sounds: Normal heart sounds. No murmur heard.    No friction rub. No gallop.   Pulmonary:      Effort: No respiratory distress.      Breath sounds: Normal breath sounds. No wheezing or rales.   Chest:      Chest wall: No tenderness.   Abdominal:      General: Bowel sounds are normal. There is no distension.      Palpations: Abdomen is soft. There is no mass.      Tenderness: There is no abdominal tenderness. There is no guarding or rebound.      Hernia: No hernia is present. "   Musculoskeletal:         General: Normal range of motion.   Skin:     General: Skin is warm and dry.      Coloration: Skin is not pale.      Findings: No erythema or rash.   Neurological:      Mental Status: He is alert and oriented to person, place, and time.   Psychiatric:         Behavior: Behavior normal.         Thought Content: Thought content normal.         Judgment: Judgment normal.          Assessment & Plan   Diagnoses and all orders for this visit:    1. FERNANDEZ (nonalcoholic steatohepatitis) (Primary)        * Surgery not found *     Diagnosis Plan   1. FERNANDEZ (nonalcoholic steatohepatitis)         Anticipated Surgical Procedure:  No orders of the defined types were placed in this encounter.      The risks, benefits, and alternatives of this procedure have been discussed with the patient or the responsible party- the patient understands and agrees to proceed.

## 2022-08-04 ENCOUNTER — OFFICE VISIT (OUTPATIENT)
Dept: FAMILY MEDICINE CLINIC | Facility: CLINIC | Age: 22
End: 2022-08-04

## 2022-08-04 VITALS
HEIGHT: 69 IN | BODY MASS INDEX: 29.34 KG/M2 | WEIGHT: 198.1 LBS | TEMPERATURE: 97.5 F | OXYGEN SATURATION: 99 % | SYSTOLIC BLOOD PRESSURE: 120 MMHG | DIASTOLIC BLOOD PRESSURE: 76 MMHG | HEART RATE: 82 BPM

## 2022-08-04 DIAGNOSIS — F41.1 GENERALIZED ANXIETY DISORDER: ICD-10-CM

## 2022-08-04 DIAGNOSIS — L53.9 ERYTHEMA: ICD-10-CM

## 2022-08-04 DIAGNOSIS — Z76.89 ENCOUNTER TO ESTABLISH CARE: Primary | ICD-10-CM

## 2022-08-04 PROBLEM — K75.81 NASH (NONALCOHOLIC STEATOHEPATITIS): Status: ACTIVE | Noted: 2022-08-04

## 2022-08-04 PROCEDURE — 99213 OFFICE O/P EST LOW 20 MIN: CPT | Performed by: STUDENT IN AN ORGANIZED HEALTH CARE EDUCATION/TRAINING PROGRAM

## 2022-08-04 RX ORDER — DESVENLAFAXINE SUCCINATE 50 MG/1
50 TABLET, EXTENDED RELEASE ORAL DAILY
Qty: 30 TABLET | Refills: 1 | Status: SHIPPED | OUTPATIENT
Start: 2022-08-04 | End: 2022-10-03

## 2022-08-04 RX ORDER — VENLAFAXINE 50 MG/1
50 TABLET ORAL 2 TIMES DAILY
Qty: 30 TABLET | Refills: 1 | Status: SHIPPED | OUTPATIENT
Start: 2022-08-04 | End: 2022-08-04

## 2022-08-04 NOTE — PROGRESS NOTES
I have seen the patient.  I have reviewed the notes, assessments, and/or procedures performed by Baljeet Vargas MD  during office visit I concur with her/his documentation and assessment and plan for Nigel Escobar.            This document has been electronically signed by Nelda Currie MD on August 4, 2022 16:23 CDT

## 2022-08-04 NOTE — PROGRESS NOTES
Family Medicine Residency  Baljeet Vargas MD    Subjective:     Nigel Escobar is a 21 y.o. male with concurrent medical history history of FORBES, asthma, and GILSON who presents to establish care.     FORBES - Treated by Dr. Gonzalez of GI. Losing weight.     GILSON - Patient formerly on venlafaxine but discontinued it a couple months ago due to potential hepatotoxicity. Patient currently a student at [employer]- advised to consider counseling.  We will start patient on desvenlafaxine as it is not metabolized by the liver and has the best chance of not causing him to gain weight in the setting of Forbes.  We will start on low-dose 50 mg and have him follow-up in 6 weeks to reassess.  Patient counseled about potential side effects of this medicine. We talked about rare side effect of suicidality. Patient denies any suicidal thoughts or thoughts of hurting himself.     Intermittent Asthma - Patient only has albuterol rescue inhaler. Rarely needs it.    Allergic rhinitis- currently not on medicines.     Patient complains of facial redness and cracked skin. Will send to dermatology.       The following portions of the patient's history were reviewed and updated as appropriate: allergies, current medications, past family history, past medical history, past social history, past surgical history and problem list.    Past Medical Hx:  Past Medical History:   Diagnosis Date   • Acne vulgaris    • Allergic    • Asthma    • Constipation     unspecified   • Diarrhea    • Gastroesophageal reflux disease    • Generalized anxiety disorder    • Impacted cerumen    • Seborrheic dermatitis    • Upper respiratory infection        Past Surgical Hx:  Past Surgical History:   Procedure Laterality Date   • WISDOM TOOTH EXTRACTION  04/2019       Current Meds:    Current Outpatient Medications:   •  albuterol sulfate HFA (Ventolin HFA) 108 (90 Base) MCG/ACT inhaler, Inhale 2 puffs 4 (Four) Times a Day. (Patient taking differently: Inhale 2  "puffs 4 (Four) Times a Day As Needed.), Disp: 18 g, Rfl: 2  •  venlafaxine (EFFEXOR) 50 MG tablet, Take 1 tablet by mouth 2 (Two) Times a Day., Disp: 30 tablet, Rfl: 1    Allergies:  No Known Allergies    Family Hx:  Family History   Problem Relation Age of Onset   • Diabetes Mother    • Depression Mother    • Bipolar disorder Mother    • Cancer Maternal Uncle         colon   • Cancer Paternal Aunt    • Developmental Disability Paternal Aunt    • Hypertension Paternal Aunt    • Diabetes Father    • Heart disease Maternal Grandmother    • Cancer Maternal Grandmother    • Heart disease Maternal Grandfather         Social History:  Social History     Socioeconomic History   • Marital status: Single   • Number of children: 0   Tobacco Use   • Smoking status: Never Smoker   • Smokeless tobacco: Never Used   Vaping Use   • Vaping Use: Never used   Substance and Sexual Activity   • Alcohol use: Never   • Drug use: Never   • Sexual activity: Defer       Review of Systems  Review of Systems   Constitutional: Negative for chills and fever.   Respiratory: Negative for shortness of breath and wheezing.    Cardiovascular: Negative for chest pain.   Gastrointestinal: Negative for nausea.   Genitourinary: Negative for difficulty urinating and dysuria.   Neurological: Negative for dizziness and headaches.   Psychiatric/Behavioral: Negative for self-injury and suicidal ideas.       Objective:     /76   Pulse 82   Temp 97.5 °F (36.4 °C)   Ht 175.3 cm (69\")   Wt 89.9 kg (198 lb 1.6 oz)   SpO2 99%   BMI 29.25 kg/m²   Physical Exam  Constitutional:       General: He is not in acute distress.     Appearance: He is obese. He is not ill-appearing, toxic-appearing or diaphoretic.   HENT:      Head: Normocephalic and atraumatic.      Nose: Nose normal. No congestion.      Mouth/Throat:      Mouth: Mucous membranes are moist.      Pharynx: Oropharynx is clear. No oropharyngeal exudate.   Cardiovascular:      Rate and Rhythm: " Normal rate and regular rhythm.   Pulmonary:      Effort: Pulmonary effort is normal. No respiratory distress.      Breath sounds: Normal breath sounds. No wheezing or rales.   Abdominal:      General: There is no distension.      Tenderness: There is no abdominal tenderness.   Musculoskeletal:      Right lower leg: No edema.      Left lower leg: No edema.   Skin:     Comments: Erythema and cracked skin around face    Neurological:      Mental Status: He is alert.      Motor: No weakness.          Assessment/Plan:     Diagnoses and all orders for this visit:    1. Encounter to establish care (Primary)  He was here to establish care.    2. Erythema  Patient complains of persistent facial redness and cracked skin.  He has changed soaps without success.  Will send to dermatology.   -     Ambulatory Referral to Dermatology    3. Generalized anxiety disorder  GILSON - Patient formerly on venlafaxine but discontinued it a couple months ago due to potential hepatotoxicity. Patient currently a student at [employer]- advised to consider counseling.  We will start low-dose desvenlafaxine today as it will not be metabolized by liver at all and is less likely to cause him to gain weight. We talked about rare side effect of suicidality. Patient denies any suicidal thoughts or thoughts of hurting himself.       -   Desvenlafaxine 50 mg daily           Follow-up:     Return in about 2 months (around 10/4/2022) for GILSON.    Preventative:  Health Maintenance   Topic Date Due   • HPV VACCINES (1 - Male 2-dose series) Never done   • ANNUAL PHYSICAL  08/12/2021   • TDAP/TD VACCINES (2 - Td or Tdap) 04/05/2022   • INFLUENZA VACCINE  10/01/2022   • HEPATITIS C SCREENING  Completed   • COVID-19 Vaccine  Completed   • MENINGOCOCCAL VACCINE  Completed   • Pneumococcal Vaccine 0-64  Aged Out     Alcohol use:  reports no history of alcohol use.  Nicotine status  reports that he has never smoked. He has never used smokeless tobacco.     Goals     None         RISK SCORE: 4      This document has been electronically signed by Baljeet Vargas MD on August 4, 2022 16:05 CDT

## 2022-08-08 ENCOUNTER — TELEPHONE (OUTPATIENT)
Dept: FAMILY MEDICINE CLINIC | Facility: CLINIC | Age: 22
End: 2022-08-08

## 2022-08-08 NOTE — TELEPHONE ENCOUNTER
PATIENT'S AUNT CALLED STATING DR MONROY CHANGED MED FROM VENLAFAXINE TO DESVENLAFAXINE. IT WAS SENT TO CLINIC PHARMACY IN [place]. WHEN THEY PICKED MEDS UP BOTH BOTTLES WERE IN BAG. SHE IS WANTING TO VERIFY WHAT HE NEEDS TO BE TAKING. SHE THINKS THE CLINIC FILLED THE OLD ONE BY ACCIDENT. HER CALL BACK UMBER [phone]

## 2022-08-08 NOTE — TELEPHONE ENCOUNTER
I received a phone message requesting a phone call.  I prescribed this patient desvenlafaxine recently in clinic.  He had previously been on venlafaxine, but discontinued this medicine due to concurrent treatment of fatty liver disease.  Desvenlafaxine is not metabolized by the liver, unlike venlafaxine, and is not associated with a large amount of weight gain.  The patient's caregiver called to ask which medicine he was supposed to take.  I told her that he should take desvenlafaxine and that venlafaxine is not even on his medicine list anymore.  She voiced understanding at the end of our conversation.      This document has been electronically signed by Baljeet Vargas MD on August 8, 2022 12:20 CDT

## 2022-10-01 DIAGNOSIS — F41.1 GENERALIZED ANXIETY DISORDER: ICD-10-CM

## 2022-10-03 RX ORDER — DESVENLAFAXINE SUCCINATE 50 MG/1
50 TABLET, EXTENDED RELEASE ORAL DAILY
Qty: 30 TABLET | Refills: 1 | Status: SHIPPED | OUTPATIENT
Start: 2022-10-03 | End: 2022-10-06 | Stop reason: SDUPTHER

## 2022-10-06 ENCOUNTER — OFFICE VISIT (OUTPATIENT)
Dept: FAMILY MEDICINE CLINIC | Facility: CLINIC | Age: 22
End: 2022-10-06

## 2022-10-06 VITALS
BODY MASS INDEX: 28.38 KG/M2 | OXYGEN SATURATION: 97 % | HEART RATE: 97 BPM | SYSTOLIC BLOOD PRESSURE: 118 MMHG | TEMPERATURE: 97.3 F | HEIGHT: 69 IN | DIASTOLIC BLOOD PRESSURE: 78 MMHG | WEIGHT: 191.6 LBS

## 2022-10-06 DIAGNOSIS — F41.1 GENERALIZED ANXIETY DISORDER: Primary | ICD-10-CM

## 2022-10-06 PROCEDURE — 99213 OFFICE O/P EST LOW 20 MIN: CPT | Performed by: STUDENT IN AN ORGANIZED HEALTH CARE EDUCATION/TRAINING PROGRAM

## 2022-10-06 RX ORDER — DESVENLAFAXINE 100 MG/1
100 TABLET, EXTENDED RELEASE ORAL DAILY
Qty: 30 TABLET | Refills: 1 | Status: SHIPPED | OUTPATIENT
Start: 2022-10-06 | End: 2023-01-10

## 2022-10-06 RX ORDER — VENLAFAXINE 50 MG/1
50 TABLET ORAL 2 TIMES DAILY
COMMUNITY
Start: 2022-08-05 | End: 2022-10-06 | Stop reason: SDUPTHER

## 2022-10-06 NOTE — PROGRESS NOTES
"  Family Medicine Residency  Baljeet Vargas MD    Subjective:     Nigel Escobar is a 22 y.o. male who presents for follow-up for anxiety.  I had seen him sometime ago and had started him on Pristiq.  He says it has helped him but believes he could have better control of his anxiety.  GILSON score today is consistent with that as it is 16.  I will increase this dose today.  We once again discussed potential side effects and patient agreeable to this.  I once again recommended counseling and patient again seemed hesitant for this as he says the counseling office at [facility] is \"packed.\"  No thoughts of suicide today.      The following portions of the patient's history were reviewed and updated as appropriate: allergies, current medications, past family history, past medical history, past social history, past surgical history and problem list.    Past Medical Hx:  Past Medical History:   Diagnosis Date   • Acne vulgaris    • Allergic    • Asthma    • Constipation     unspecified   • Diarrhea    • Gastroesophageal reflux disease    • Generalized anxiety disorder    • Impacted cerumen    • Seborrheic dermatitis    • Upper respiratory infection        Past Surgical Hx:  Past Surgical History:   Procedure Laterality Date   • WISDOM TOOTH EXTRACTION  04/2019       Current Meds:    Current Outpatient Medications:   •  albuterol sulfate HFA (Ventolin HFA) 108 (90 Base) MCG/ACT inhaler, Inhale 2 puffs 4 (Four) Times a Day. (Patient taking differently: Inhale 2 puffs 4 (Four) Times a Day As Needed.), Disp: 18 g, Rfl: 2  •  desvenlafaxine (Pristiq) 100 MG 24 hr tablet, Take 1 tablet by mouth Daily., Disp: 30 tablet, Rfl: 1    Allergies:  No Known Allergies    Family Hx:  Family History   Problem Relation Age of Onset   • Diabetes Mother    • Depression Mother    • Bipolar disorder Mother    • Cancer Maternal Uncle         colon   • Cancer Paternal Aunt    • Developmental Disability Paternal Aunt    • " "Hypertension Paternal Aunt    • Diabetes Father    • Heart disease Maternal Grandmother    • Cancer Maternal Grandmother    • Heart disease Maternal Grandfather         Social History:  Social History     Socioeconomic History   • Marital status: Single   • Number of children: 0   Tobacco Use   • Smoking status: Never Smoker   • Smokeless tobacco: Never Used   Vaping Use   • Vaping Use: Never used   Substance and Sexual Activity   • Alcohol use: Never   • Drug use: Never   • Sexual activity: Defer       Review of Systems  Review of Systems   Psychiatric/Behavioral: Negative for suicidal ideas. The patient is nervous/anxious.        Objective:     /78   Pulse 97   Temp 97.3 °F (36.3 °C)   Ht 175.3 cm (69\")   Wt 86.9 kg (191 lb 9.6 oz)   SpO2 97%   BMI 28.29 kg/m²   Physical Exam  Constitutional:       General: He is not in acute distress.     Appearance: Normal appearance. He is not ill-appearing, toxic-appearing or diaphoretic.   Neurological:      Mental Status: He is alert.          Assessment/Plan:     Diagnoses and all orders for this visit:    1. Generalized anxiety disorder (Primary)    Nigel Escobar is a 22 y.o. male who presents for follow-up for anxiety.  I had seen him sometime ago and had started him on Pristiq.  He says it has helped him but believes he could have better control of his anxiety.  GILSON score today is consistent with that as it is 16.  I will increase this dose today.  We once again discussed potential side effects and patient agreeable to this.  I once again recommended counseling and patient again seemed hesitant for this as he says the counseling office at [facility] is \"packed.\"  No thoughts of suicide today.    -     desvenlafaxine (Pristiq) 100 MG 24 hr tablet; Take 1 tablet by mouth Daily.  Dispense: 30 tablet; Refill: 1        · Rx changes: Increased dose of Pristiq    Follow-up:     Return in about 2 months (around 12/6/2022) for " GILSON.    Preventative:  Health Maintenance   Topic Date Due   • ANNUAL PHYSICAL  08/12/2021   • TDAP/TD VACCINES (2 - Td or Tdap) 04/05/2022   • COVID-19 Vaccine (4 - Booster for Moderna series) 05/20/2022   • INFLUENZA VACCINE  08/01/2022   • HEPATITIS C SCREENING  Completed   • MENINGOCOCCAL VACCINE  Completed   • Pneumococcal Vaccine 0-64  Aged Out     Alcohol use:  reports no history of alcohol use.  Nicotine status  reports that he has never smoked. He has never used smokeless tobacco.     Goals    None         RISK SCORE: 4      This document has been electronically signed by Baljeet Vargas MD on October 6, 2022 19:09 CDT

## 2022-10-10 NOTE — PROGRESS NOTES
I have reviewed the notes, assessments, and/or procedures performed by Baljeet Vargas MD during office visit. I concur with her/his documentation and assessment and plan for Nigel Escobar.          This document has been electronically signed by Nelda Currie MD on October 10, 2022 10:28 CDT

## 2022-10-21 ENCOUNTER — LAB (OUTPATIENT)
Dept: LAB | Facility: HOSPITAL | Age: 22
End: 2022-10-21

## 2022-10-21 DIAGNOSIS — K75.81 NASH (NONALCOHOLIC STEATOHEPATITIS): ICD-10-CM

## 2022-10-21 LAB
ALBUMIN SERPL-MCNC: 4.8 G/DL (ref 3.5–5.2)
ALBUMIN/GLOB SERPL: 1.8 G/DL
ALP SERPL-CCNC: 95 U/L (ref 39–117)
ALT SERPL W P-5'-P-CCNC: 71 U/L (ref 1–41)
ANION GAP SERPL CALCULATED.3IONS-SCNC: 11.3 MMOL/L (ref 5–15)
AST SERPL-CCNC: 34 U/L (ref 1–40)
BASOPHILS # BLD AUTO: 0.03 10*3/MM3 (ref 0–0.2)
BASOPHILS NFR BLD AUTO: 0.3 % (ref 0–1.5)
BILIRUB SERPL-MCNC: 0.5 MG/DL (ref 0–1.2)
BUN SERPL-MCNC: 9 MG/DL (ref 6–20)
BUN/CREAT SERPL: 9.5 (ref 7–25)
CALCIUM SPEC-SCNC: 9.3 MG/DL (ref 8.6–10.5)
CHLORIDE SERPL-SCNC: 99 MMOL/L (ref 98–107)
CO2 SERPL-SCNC: 29.7 MMOL/L (ref 22–29)
CREAT SERPL-MCNC: 0.95 MG/DL (ref 0.76–1.27)
DEPRECATED RDW RBC AUTO: 37.9 FL (ref 37–54)
EGFRCR SERPLBLD CKD-EPI 2021: 116.1 ML/MIN/1.73
EOSINOPHIL # BLD AUTO: 0.11 10*3/MM3 (ref 0–0.4)
EOSINOPHIL NFR BLD AUTO: 1.1 % (ref 0.3–6.2)
ERYTHROCYTE [DISTWIDTH] IN BLOOD BY AUTOMATED COUNT: 12.7 % (ref 12.3–15.4)
GLOBULIN UR ELPH-MCNC: 2.7 GM/DL
GLUCOSE SERPL-MCNC: 84 MG/DL (ref 65–99)
HCT VFR BLD AUTO: 48.3 % (ref 37.5–51)
HGB BLD-MCNC: 16.2 G/DL (ref 13–17.7)
IMM GRANULOCYTES # BLD AUTO: 0.03 10*3/MM3 (ref 0–0.05)
IMM GRANULOCYTES NFR BLD AUTO: 0.3 % (ref 0–0.5)
LYMPHOCYTES # BLD AUTO: 1.91 10*3/MM3 (ref 0.7–3.1)
LYMPHOCYTES NFR BLD AUTO: 19.9 % (ref 19.6–45.3)
MCH RBC QN AUTO: 27.8 PG (ref 26.6–33)
MCHC RBC AUTO-ENTMCNC: 33.5 G/DL (ref 31.5–35.7)
MCV RBC AUTO: 83 FL (ref 79–97)
MONOCYTES # BLD AUTO: 0.78 10*3/MM3 (ref 0.1–0.9)
MONOCYTES NFR BLD AUTO: 8.1 % (ref 5–12)
NEUTROPHILS NFR BLD AUTO: 6.75 10*3/MM3 (ref 1.7–7)
NEUTROPHILS NFR BLD AUTO: 70.3 % (ref 42.7–76)
NRBC BLD AUTO-RTO: 0 /100 WBC (ref 0–0.2)
PLATELET # BLD AUTO: 409 10*3/MM3 (ref 140–450)
PMV BLD AUTO: 10 FL (ref 6–12)
POTASSIUM SERPL-SCNC: 4 MMOL/L (ref 3.5–5.2)
PROT SERPL-MCNC: 7.5 G/DL (ref 6–8.5)
RBC # BLD AUTO: 5.82 10*6/MM3 (ref 4.14–5.8)
SODIUM SERPL-SCNC: 140 MMOL/L (ref 136–145)
WBC NRBC COR # BLD: 9.61 10*3/MM3 (ref 3.4–10.8)

## 2022-10-21 PROCEDURE — 83883 ASSAY NEPHELOMETRY NOT SPEC: CPT

## 2022-10-21 PROCEDURE — 84478 ASSAY OF TRIGLYCERIDES: CPT

## 2022-10-21 PROCEDURE — 80053 COMPREHEN METABOLIC PANEL: CPT

## 2022-10-21 PROCEDURE — 82465 ASSAY BLD/SERUM CHOLESTEROL: CPT

## 2022-10-21 PROCEDURE — 85025 COMPLETE CBC W/AUTO DIFF WBC: CPT

## 2022-10-21 PROCEDURE — 82172 ASSAY OF APOLIPOPROTEIN: CPT

## 2022-10-21 PROCEDURE — 82977 ASSAY OF GGT: CPT

## 2022-10-21 PROCEDURE — 83010 ASSAY OF HAPTOGLOBIN QUANT: CPT

## 2022-12-19 ENCOUNTER — OFFICE VISIT (OUTPATIENT)
Dept: GASTROENTEROLOGY | Facility: CLINIC | Age: 22
End: 2022-12-19

## 2022-12-19 ENCOUNTER — TELEPHONE (OUTPATIENT)
Dept: FAMILY MEDICINE CLINIC | Facility: CLINIC | Age: 22
End: 2022-12-19

## 2022-12-19 VITALS
SYSTOLIC BLOOD PRESSURE: 130 MMHG | WEIGHT: 198 LBS | BODY MASS INDEX: 29.33 KG/M2 | HEART RATE: 92 BPM | DIASTOLIC BLOOD PRESSURE: 90 MMHG | HEIGHT: 69 IN

## 2022-12-19 DIAGNOSIS — K75.81 NASH (NONALCOHOLIC STEATOHEPATITIS): Primary | ICD-10-CM

## 2022-12-19 PROCEDURE — 99213 OFFICE O/P EST LOW 20 MIN: CPT | Performed by: PHYSICIAN ASSISTANT

## 2022-12-19 NOTE — TELEPHONE ENCOUNTER
Update: Patients guardian informed, she will contact providers office.     Patients guardian contacted the office requesting refill on patients inhaler. The patients chart shows that the patient is being seen by another provider. I attempted to contact the patients guardian again to inform her this would need to be filled by the PCP. There was no answer, left voice message advising to contact the office when possible.

## 2022-12-19 NOTE — PROGRESS NOTES
[facility]      Chief Complaint:   Chief Complaint   Patient presents with   • Follow-up       Subjective     HPI:   Patient presents for follow-up related to fatty liver.  Patient previously a patient of Dr. Joy who has been followed for fatty liver for approximately 1 year.  Patient was previously advised to exercise and lose weight.  Today he reports that he was down to 190 pounds but during his college finals he ate more and has gained a few pounds back.  Patient today weighs 198.  Patient denies alcohol or drug use.    Laboratory studies- WBC count 9.6, hemoglobin 16.2, hematocrit 48.3, platelet count 409,000, glucose 84, creatinine 0.95, ALT 71, AST 34, alkaline phosphatase 95, total bilirubin 0.5, Forbes FibroSure score- fibrosis 0.16/F0, steatosis 0.46/S1, Forbes score 0.50/N1.    Assessment/plan:  1.  NAFLD- patient will continue weight loss efforts.  Discussed lifestyle modifications, diet changes, decreasing processed foods, decreasing carbohydrates and fructose and increasing physical activity.  Patient voices understanding.  All questions answered.  I will see patient back in 6 months for recheck with labs prior.    Past Medical History:   Past Medical History:   Diagnosis Date   • Acne vulgaris    • Allergic    • Asthma    • Constipation     unspecified   • Diarrhea    • Gastroesophageal reflux disease    • Generalized anxiety disorder    • Impacted cerumen    • Seborrheic dermatitis    • Upper respiratory infection        Past Surgical History:  Past Surgical History:   Procedure Laterality Date   • WISDOM TOOTH EXTRACTION  04/2019       Family History:  Family History   Problem Relation Age of Onset   • Diabetes Mother    • Depression Mother    • Bipolar disorder Mother    • Cancer Maternal Uncle         colon   • Cancer Paternal Aunt    • Developmental Disability Paternal Aunt    • Hypertension Paternal Aunt    • Diabetes Father    • Heart disease  "Maternal Grandmother    • Cancer Maternal Grandmother    • Heart disease Maternal Grandfather        Social History:   reports that he has never smoked. He has never used smokeless tobacco. He reports that he does not drink alcohol and does not use drugs.    Medications:   Prior to Admission medications    Medication Sig Start Date End Date Taking? Authorizing Provider   albuterol sulfate HFA (Ventolin HFA) 108 (90 Base) MCG/ACT inhaler Inhale 2 puffs 4 (Four) Times a Day.  Patient taking differently: Inhale 2 puffs 4 (Four) Times a Day As Needed. 5/3/21   Aylin Gardner APRN   desvenlafaxine (Pristiq) 100 MG 24 hr tablet Take 1 tablet by mouth Daily. 10/6/22   Baljeet Vargas MD       Allergies:  Patient has no known allergies.    ROS:    Review of Systems   Constitutional: Negative.  Negative for fever.   HENT: Negative.    Eyes: Negative.    Respiratory: Negative.  Negative for shortness of breath.    Cardiovascular: Negative.  Negative for chest pain.   Gastrointestinal: Negative.    Endocrine: Negative.    Genitourinary: Negative.    Skin: Negative.    Neurological: Negative.    Hematological: Negative.    Psychiatric/Behavioral: Negative.      Objective     Blood pressure 130/90, pulse 92, height 175.3 cm (69\"), weight 89.8 kg (198 lb).    Physical Exam  Vitals and nursing note reviewed.   Constitutional:       Appearance: Normal appearance.   HENT:      Head: Normocephalic and atraumatic.   Eyes:      General: No scleral icterus.  Cardiovascular:      Rate and Rhythm: Normal rate and regular rhythm.   Pulmonary:      Effort: Pulmonary effort is normal.      Breath sounds: Normal breath sounds.   Abdominal:      General: Bowel sounds are normal.      Palpations: Abdomen is soft.      Tenderness: There is no abdominal tenderness. There is no guarding or rebound.   Skin:     Coloration: Skin is not jaundiced.   Neurological:      General: No focal deficit present.      Mental Status: He is alert. "   Psychiatric:         Behavior: Behavior normal.          Assessment & Plan   Diagnoses and all orders for this visit:    1. FERNANDEZ (nonalcoholic steatohepatitis) (Primary)  -     CBC & Differential; Future  -     Protime-INR; Future  -     Comprehensive Metabolic Panel; Future        * Surgery not found *     Diagnosis Plan   1. FERNANDEZ (nonalcoholic steatohepatitis)  CBC & Differential    Protime-INR    Comprehensive Metabolic Panel          Anticipated Surgical Procedure:  Orders Placed This Encounter   Procedures   • Protime-INR     Standing Status:   Future     Standing Expiration Date:   12/19/2023     Order Specific Question:   Release to patient     Answer:   Routine Release   • Comprehensive Metabolic Panel     Standing Status:   Future     Standing Expiration Date:   12/19/2023     Order Specific Question:   Release to patient     Answer:   Routine Release   • CBC & Differential     Standing Status:   Future     Standing Expiration Date:   12/19/2023     Order Specific Question:   Manual Differential     Answer:   No     Order Specific Question:   Release to patient     Answer:   Routine Release       The risks, benefits, and alternatives of this procedure have been discussed with the patient or the responsible party- the patient understands and agrees to proceed.

## 2023-01-10 DIAGNOSIS — F41.1 GENERALIZED ANXIETY DISORDER: ICD-10-CM

## 2023-01-10 RX ORDER — DESVENLAFAXINE 100 MG/1
100 TABLET, EXTENDED RELEASE ORAL DAILY
Qty: 30 TABLET | Refills: 1 | Status: SHIPPED | OUTPATIENT
Start: 2023-01-10 | End: 2023-03-22

## 2023-03-22 DIAGNOSIS — F41.1 GENERALIZED ANXIETY DISORDER: ICD-10-CM

## 2023-03-22 RX ORDER — DESVENLAFAXINE 100 MG/1
100 TABLET, EXTENDED RELEASE ORAL DAILY
Qty: 30 TABLET | Refills: 1 | Status: SHIPPED | OUTPATIENT
Start: 2023-03-22

## 2023-04-28 ENCOUNTER — OFFICE VISIT (OUTPATIENT)
Dept: FAMILY MEDICINE CLINIC | Facility: CLINIC | Age: 23
End: 2023-04-28
Payer: COMMERCIAL

## 2023-04-28 VITALS
HEART RATE: 98 BPM | BODY MASS INDEX: 30.72 KG/M2 | SYSTOLIC BLOOD PRESSURE: 130 MMHG | HEIGHT: 69 IN | OXYGEN SATURATION: 99 % | DIASTOLIC BLOOD PRESSURE: 80 MMHG | WEIGHT: 207.4 LBS | TEMPERATURE: 96.3 F

## 2023-04-28 DIAGNOSIS — F41.1 GENERALIZED ANXIETY DISORDER: Primary | ICD-10-CM

## 2023-04-28 RX ORDER — BUSPIRONE HYDROCHLORIDE 5 MG/1
5 TABLET ORAL 2 TIMES DAILY
Qty: 60 TABLET | Refills: 2 | Status: SHIPPED | OUTPATIENT
Start: 2023-04-28

## 2023-04-28 RX ORDER — DESVENLAFAXINE SUCCINATE 50 MG/1
50 TABLET, EXTENDED RELEASE ORAL DAILY
Qty: 30 TABLET | Refills: 0 | Status: SHIPPED | OUTPATIENT
Start: 2023-04-28

## 2023-04-28 NOTE — PROGRESS NOTES
Family Medicine Residency  Baljeet Vargas MD    Subjective:     Nigel Escobar is a 22 y.o. male who presents for follow-up for generalized anxiety disorder.    Patient has been gradually titrated to the maximum dose of Pristiq without significant improvement.  Expresses interest in switching to an alternative agent.  Denies any suicidality today.  Will taper Pristiq from 100 mg to 50 mg.  Patient had previously had someone else try to take him off this medication and had some significant withdrawal symptoms and expresses interest in a slower taper.  After completion of 1 month taper at 50 mg dose, will decrease to 25 mg.  At the same time, we will start BuSpar 5 mg twice daily for adjunctive treatment of anxiety while tapering Pristiq.  We will see patient back in 6 weeks to assess for improvement.  Denies any suicidality today    The following portions of the patient's history were reviewed and updated as appropriate: allergies, current medications, past family history, past medical history, past social history, past surgical history and problem list.    Past Medical Hx:  Past Medical History:   Diagnosis Date   • Acne vulgaris    • Allergic    • Asthma    • Constipation     unspecified   • Diarrhea    • Gastroesophageal reflux disease    • Generalized anxiety disorder    • Impacted cerumen    • Seborrheic dermatitis    • Upper respiratory infection        Past Surgical Hx:  Past Surgical History:   Procedure Laterality Date   • WISDOM TOOTH EXTRACTION  04/2019       Current Meds:    Current Outpatient Medications:   •  albuterol sulfate HFA (Ventolin HFA) 108 (90 Base) MCG/ACT inhaler, Inhale 2 puffs 4 (Four) Times a Day. (Patient taking differently: Inhale 2 puffs 4 (Four) Times a Day As Needed.), Disp: 18 g, Rfl: 2  •  busPIRone (BUSPAR) 5 MG tablet, Take 1 tablet by mouth 2 (Two) Times a Day., Disp: 60 tablet, Rfl: 2  •  desvenlafaxine (Pristiq) 50 MG 24 hr tablet, Take 1 tablet by mouth Daily.,  "Disp: 30 tablet, Rfl: 0    Allergies:  No Known Allergies    Family Hx:  Family History   Problem Relation Age of Onset   • Diabetes Mother    • Depression Mother    • Bipolar disorder Mother    • Cancer Maternal Uncle         colon   • Cancer Paternal Aunt    • Developmental Disability Paternal Aunt    • Hypertension Paternal Aunt    • Diabetes Father    • Heart disease Maternal Grandmother    • Cancer Maternal Grandmother    • Heart disease Maternal Grandfather         Social History:  Social History     Socioeconomic History   • Marital status: Single   • Number of children: 0   Tobacco Use   • Smoking status: Never   • Smokeless tobacco: Never   Vaping Use   • Vaping Use: Never used   Substance and Sexual Activity   • Alcohol use: Never   • Drug use: Never   • Sexual activity: Defer       Review of Systems  Review of Systems   Psychiatric/Behavioral: Negative for self-injury and suicidal ideas. The patient is nervous/anxious.        Objective:     /80   Pulse 98   Temp 96.3 °F (35.7 °C)   Ht 175.3 cm (69\")   Wt 94.1 kg (207 lb 6.4 oz)   SpO2 99%   BMI 30.63 kg/m²   Physical Exam  Constitutional:       General: He is not in acute distress.     Appearance: Normal appearance. He is obese. He is not ill-appearing, toxic-appearing or diaphoretic.   HENT:      Head: Atraumatic.   Eyes:      Extraocular Movements: Extraocular movements intact.   Pulmonary:      Effort: Pulmonary effort is normal. No respiratory distress.   Musculoskeletal:      Cervical back: Normal range of motion.   Neurological:      Mental Status: He is alert.   Psychiatric:         Mood and Affect: Mood normal.         Behavior: Behavior normal.          Assessment/Plan:     Diagnoses and all orders for this visit:    1. Generalized anxiety disorder (Primary)  Nigel Escobar is a 22 y.o. male who presents for follow-up for generalized anxiety disorder.    Patient has been gradually titrated to the maximum dose of Pristiq " without significant improvement.  Expresses interest in switching to an alternative agent.  Denies any suicidality today.  Will taper Pristiq from 100 mg to 50 mg.  Patient had previously had someone else try to take him off this medication and had some significant withdrawal symptoms and expresses interest in a slower taper.  After completion of 1 month taper at 50 mg dose, will decrease to 25 mg.  At the same time, we will start BuSpar 5 mg twice daily for adjunctive treatment of anxiety while tapering Pristiq.  We will see patient back in 6 weeks to assess for improvement.  Denies any suicidality today    -     busPIRone (BUSPAR) 5 MG tablet; Take 1 tablet by mouth 2 (Two) Times a Day.  Dispense: 60 tablet; Refill: 2  -     desvenlafaxine (Pristiq) 50 MG 24 hr tablet; Take 1 tablet by mouth Daily.  Dispense: 30 tablet; Refill: 0        · Rx changes: Tapering Pristiq by decreasing dose from 100mg to 50 mg with plans to continue to wean as tolerated.  Initiating treatment with BuSpar at 5 mg twice daily.     Follow-up:     Return in about 6 weeks (around 6/9/2023) for Anxiety .    Preventative:  Health Maintenance   Topic Date Due   • ANNUAL PHYSICAL  08/11/2021   • TDAP/TD VACCINES (2 - Td or Tdap) 04/05/2022   • COVID-19 Vaccine (4 - Booster for Moderna series) 05/20/2022   • INFLUENZA VACCINE  08/01/2023   • HEPATITIS C SCREENING  Completed   • MENINGOCOCCAL VACCINE  Completed   • Pneumococcal Vaccine 0-64  Aged Out         Alcohol use:  reports no history of alcohol use.  Nicotine status  reports that he has never smoked. He has never used smokeless tobacco.     Goals    None         RISK SCORE: 4      This document has been electronically signed by Baljeet Vargas MD on April 28, 2023 17:29 CDT

## 2023-06-09 ENCOUNTER — OFFICE VISIT (OUTPATIENT)
Dept: FAMILY MEDICINE CLINIC | Facility: CLINIC | Age: 23
End: 2023-06-09
Payer: COMMERCIAL

## 2023-06-09 VITALS — BODY MASS INDEX: 30.63 KG/M2 | HEIGHT: 69 IN

## 2023-06-09 DIAGNOSIS — F41.1 GENERALIZED ANXIETY DISORDER: Primary | ICD-10-CM

## 2023-06-09 RX ORDER — DESVENLAFAXINE 25 MG/1
25 TABLET, EXTENDED RELEASE ORAL DAILY
Qty: 30 TABLET | Refills: 0 | Status: SHIPPED | OUTPATIENT
Start: 2023-06-09

## 2023-06-09 NOTE — PROGRESS NOTES
Family Medicine Residency  Baljeet Vargas MD    Subjective:     Nigel Escobar is a 22 y.o. male who presents for anxiety. We are titrating down on pristique. At last visit, decreased to 50 mg. He has had no withdrawal. Will now titrate down to 25 mg for two weeks, then 25 mg every other day. Will come back in one month after stopping and will discuss other potential treatment medications.     Last visit, I tried to start Buspar for adjunctive treeatment while tapering pristiq. Patient tells me he did not get this and has not been taking it.      The following portions of the patient's history were reviewed and updated as appropriate: allergies, current medications, past family history, past medical history, past social history, past surgical history and problem list.    Past Medical Hx:  Past Medical History:   Diagnosis Date   • Acne vulgaris    • Allergic    • Asthma    • Constipation     unspecified   • Diarrhea    • Gastroesophageal reflux disease    • Generalized anxiety disorder    • Impacted cerumen    • Seborrheic dermatitis    • Upper respiratory infection        Past Surgical Hx:  Past Surgical History:   Procedure Laterality Date   • WISDOM TOOTH EXTRACTION  04/2019       Current Meds:    Current Outpatient Medications:   •  albuterol sulfate HFA (Ventolin HFA) 108 (90 Base) MCG/ACT inhaler, Inhale 2 puffs 4 (Four) Times a Day. (Patient taking differently: Inhale 2 puffs 4 (Four) Times a Day As Needed.), Disp: 18 g, Rfl: 2  •  busPIRone (BUSPAR) 5 MG tablet, Take 1 tablet by mouth 2 (Two) Times a Day., Disp: 60 tablet, Rfl: 2  •  Desvenlafaxine Succinate ER (Pristiq) 25 MG tablet sustained-release 24 hour, Take 1 tablet by mouth Daily., Disp: 30 tablet, Rfl: 0    Allergies:  No Known Allergies    Family Hx:  Family History   Problem Relation Age of Onset   • Diabetes Mother    • Depression Mother    • Bipolar disorder Mother    • Cancer Maternal Uncle         colon   • Cancer Paternal Aunt   "  • Developmental Disability Paternal Aunt    • Hypertension Paternal Aunt    • Diabetes Father    • Heart disease Maternal Grandmother    • Cancer Maternal Grandmother    • Heart disease Maternal Grandfather         Social History:  Social History     Socioeconomic History   • Marital status: Single   • Number of children: 0   Tobacco Use   • Smoking status: Never   • Smokeless tobacco: Never   Vaping Use   • Vaping Use: Never used   Substance and Sexual Activity   • Alcohol use: Never   • Drug use: Never   • Sexual activity: Defer       Review of Systems  Review of Systems   Constitutional: Negative for chills, diaphoresis and fever.   Gastrointestinal: Negative for abdominal pain, nausea and vomiting.   Psychiatric/Behavioral: The patient is nervous/anxious.        Objective:     Ht 175.3 cm (69\")   BMI 30.63 kg/m²   Physical Exam  Constitutional:       General: He is not in acute distress.     Appearance: Normal appearance. He is not ill-appearing, toxic-appearing or diaphoretic.   HENT:      Head: Normocephalic and atraumatic.   Eyes:      Extraocular Movements: Extraocular movements intact.   Pulmonary:      Effort: Pulmonary effort is normal. No respiratory distress.   Musculoskeletal:      Cervical back: Normal range of motion.   Neurological:      Mental Status: He is alert.   Psychiatric:         Mood and Affect: Mood normal.         Behavior: Behavior normal.          Assessment/Plan:     Diagnoses and all orders for this visit:    1. Generalized anxiety disorder (Primary)    Nigel Escobar is a 22 y.o. male who presents for anxiety. We are titrating down on pristique. At last visit, decreased to 50 mg. He has had no withdrawal. Will now titrate down to 25 mg for two weeks, then 25 mg every other day. Will come back in one month after stopping and will discuss other potential treatment medications.     Last visit, I tried to start Buspar for adjunctive treeatment while tapering pristiq. Patient " tells me he did not get this and has not been taking it.    -     Desvenlafaxine Succinate ER (Pristiq) 25 MG tablet sustained-release 24 hour; Take 1 tablet by mouth Daily.  Dispense: 30 tablet; Refill: 0        · Rx changes: Decreasing pristiq from 50 mg to 25 mg      Follow-up:     Return in about 1 month (around 7/9/2023) for Recheck Anxiety .    Preventative:  Health Maintenance   Topic Date Due   • ANNUAL PHYSICAL  08/11/2021   • TDAP/TD VACCINES (2 - Td or Tdap) 04/05/2022   • COVID-19 Vaccine (4 - Moderna series) 05/20/2022   • INFLUENZA VACCINE  08/01/2023   • HEPATITIS C SCREENING  Completed   • MENINGOCOCCAL VACCINE  Completed   • Pneumococcal Vaccine 0-64  Aged Out       Alcohol use:  reports no history of alcohol use.  Nicotine status  reports that he has never smoked. He has never used smokeless tobacco.     Goals    None         RISK SCORE: 3      This document has been electronically signed by Baljeet Vargas MD on June 9, 2023 17:05 CDT

## 2023-06-15 ENCOUNTER — LAB (OUTPATIENT)
Dept: LAB | Facility: OTHER | Age: 23
End: 2023-06-15
Payer: COMMERCIAL

## 2023-06-15 DIAGNOSIS — K75.81 NASH (NONALCOHOLIC STEATOHEPATITIS): ICD-10-CM

## 2023-06-15 LAB
ALBUMIN SERPL-MCNC: 4.2 G/DL (ref 3.5–5)
ALBUMIN/GLOB SERPL: 1.3 G/DL (ref 1.1–1.8)
ALP SERPL-CCNC: 63 U/L (ref 38–126)
ALT SERPL W P-5'-P-CCNC: 126 U/L
ANION GAP SERPL CALCULATED.3IONS-SCNC: 9 MMOL/L (ref 5–15)
AST SERPL-CCNC: 47 U/L (ref 17–59)
BASOPHILS # BLD AUTO: 0.06 10*3/MM3 (ref 0–0.2)
BASOPHILS NFR BLD AUTO: 0.9 % (ref 0–1.5)
BILIRUB SERPL-MCNC: 0.8 MG/DL (ref 0.2–1.3)
BUN SERPL-MCNC: 15 MG/DL (ref 7–23)
BUN/CREAT SERPL: 14.2 (ref 7–25)
CALCIUM SPEC-SCNC: 9 MG/DL (ref 8.4–10.2)
CHLORIDE SERPL-SCNC: 101 MMOL/L (ref 101–112)
CO2 SERPL-SCNC: 29 MMOL/L (ref 22–30)
CREAT SERPL-MCNC: 1.06 MG/DL (ref 0.7–1.3)
DEPRECATED RDW RBC AUTO: 38.6 FL (ref 37–54)
EGFRCR SERPLBLD CKD-EPI 2021: 101.8 ML/MIN/1.73
EOSINOPHIL # BLD AUTO: 0.12 10*3/MM3 (ref 0–0.4)
EOSINOPHIL NFR BLD AUTO: 1.8 % (ref 0.3–6.2)
ERYTHROCYTE [DISTWIDTH] IN BLOOD BY AUTOMATED COUNT: 12.8 % (ref 12.3–15.4)
GLOBULIN UR ELPH-MCNC: 3.2 GM/DL (ref 2.3–3.5)
GLUCOSE SERPL-MCNC: 92 MG/DL (ref 70–99)
HCT VFR BLD AUTO: 45.5 % (ref 37.5–51)
HGB BLD-MCNC: 15.6 G/DL (ref 13–17.7)
INR PPP: 0.97 (ref 0.8–1.2)
LYMPHOCYTES # BLD AUTO: 1.98 10*3/MM3 (ref 0.7–3.1)
LYMPHOCYTES NFR BLD AUTO: 29.4 % (ref 19.6–45.3)
MCH RBC QN AUTO: 28.5 PG (ref 26.6–33)
MCHC RBC AUTO-ENTMCNC: 34.3 G/DL (ref 31.5–35.7)
MCV RBC AUTO: 83 FL (ref 79–97)
MONOCYTES # BLD AUTO: 0.67 10*3/MM3 (ref 0.1–0.9)
MONOCYTES NFR BLD AUTO: 9.9 % (ref 5–12)
NEUTROPHILS NFR BLD AUTO: 3.91 10*3/MM3 (ref 1.7–7)
NEUTROPHILS NFR BLD AUTO: 58 % (ref 42.7–76)
PLATELET # BLD AUTO: 358 10*3/MM3 (ref 140–450)
PMV BLD AUTO: 9.9 FL (ref 6–12)
POTASSIUM SERPL-SCNC: 4 MMOL/L (ref 3.4–5)
PROT SERPL-MCNC: 7.4 G/DL (ref 6.3–8.6)
PROTHROMBIN TIME: 12.9 SECONDS (ref 12.2–14.2)
RBC # BLD AUTO: 5.48 10*6/MM3 (ref 4.14–5.8)
SODIUM SERPL-SCNC: 139 MMOL/L (ref 137–145)
WBC NRBC COR # BLD: 6.74 10*3/MM3 (ref 3.4–10.8)

## 2023-06-15 PROCEDURE — 85025 COMPLETE CBC W/AUTO DIFF WBC: CPT | Performed by: PHYSICIAN ASSISTANT

## 2023-06-15 PROCEDURE — 80053 COMPREHEN METABOLIC PANEL: CPT | Performed by: PHYSICIAN ASSISTANT

## 2023-06-15 PROCEDURE — 85610 PROTHROMBIN TIME: CPT | Performed by: PHYSICIAN ASSISTANT

## 2023-06-19 ENCOUNTER — OFFICE VISIT (OUTPATIENT)
Dept: GASTROENTEROLOGY | Facility: CLINIC | Age: 23
End: 2023-06-19
Payer: COMMERCIAL

## 2023-06-19 VITALS
HEIGHT: 69 IN | SYSTOLIC BLOOD PRESSURE: 128 MMHG | HEART RATE: 96 BPM | BODY MASS INDEX: 30.93 KG/M2 | DIASTOLIC BLOOD PRESSURE: 72 MMHG | WEIGHT: 208.8 LBS

## 2023-06-19 DIAGNOSIS — R79.89 ELEVATED LIVER FUNCTION TESTS: ICD-10-CM

## 2023-06-19 DIAGNOSIS — K75.81 NASH (NONALCOHOLIC STEATOHEPATITIS): Primary | ICD-10-CM

## 2023-06-19 PROCEDURE — 1159F MED LIST DOCD IN RCRD: CPT | Performed by: PHYSICIAN ASSISTANT

## 2023-06-19 PROCEDURE — 99213 OFFICE O/P EST LOW 20 MIN: CPT | Performed by: PHYSICIAN ASSISTANT

## 2023-06-19 PROCEDURE — 1160F RVW MEDS BY RX/DR IN RCRD: CPT | Performed by: PHYSICIAN ASSISTANT

## 2023-06-19 RX ORDER — KETOCONAZOLE 20 MG/G
CREAM TOPICAL
COMMUNITY
Start: 2023-05-19

## 2023-06-19 NOTE — PROGRESS NOTES
[facility]      Chief Complaint:   Chief Complaint   Patient presents with   • Follow-up       Subjective     HPI:   Patient presents for follow-up related to fatty liver.  Patient with diagnosed with fatty liver approximately 2 years ago.  He states that he was advised to exercise and lose weight when he was first diagnosed.  Patient states that he got down to 190 pounds but the weight always seems to come back.  Patient states has had cyclical weight loss down to 190 for the past 1 year.  Patient's weight is 208.8 today, up 10 pounds from his previous visit.  He denies abdominal pain, nausea/vomiting and change in bowel habits.  Patient states he is attempting to avoid simple carbohydrates and has added more fruit to his diet.    Laboratory studies from 6/15/2023 show a normal CBC, INR 0.97, glucose 92, , AST 47, alkaline phosphatase 64, and total bilirubin 0.8.  ALT has increased since his previous labs.    Assessment/plan:  1.  NAFLD- discussed with patient dietary modifications including decreasing processed foods, decreasing carbohydrate consumption, increasing protein, avoiding high fructose corn syrup and increasing physical activity.  Discussed the importance of weight loss for his NAFLD.  Patient voices understanding.  Offered referral to dietitian, patient politely declines.  Patient will follow-up in 6 months for recheck with labs the week prior.    Past Medical History:   Past Medical History:   Diagnosis Date   • Acne vulgaris    • Allergic    • Asthma    • Constipation     unspecified   • Diarrhea    • Gastroesophageal reflux disease    • Generalized anxiety disorder    • Impacted cerumen    • Seborrheic dermatitis    • Upper respiratory infection        Past Surgical History:    Past Surgical History:   Procedure Laterality Date   • WISDOM TOOTH EXTRACTION  04/2019       Family History:  Family History   Problem Relation Age of Onset   • Diabetes  "Mother    • Depression Mother    • Bipolar disorder Mother    • Cancer Maternal Uncle         colon   • Cancer Paternal Aunt    • Developmental Disability Paternal Aunt    • Hypertension Paternal Aunt    • Diabetes Father    • Heart disease Maternal Grandmother    • Cancer Maternal Grandmother    • Heart disease Maternal Grandfather        Social History:   reports that he has never smoked. He has never used smokeless tobacco. He reports that he does not drink alcohol and does not use drugs.    Medications:   Prior to Admission medications    Medication Sig Start Date End Date Taking? Authorizing Provider   albuterol sulfate HFA (Ventolin HFA) 108 (90 Base) MCG/ACT inhaler Inhale 2 puffs 4 (Four) Times a Day.  Patient taking differently: Inhale 2 puffs 4 (Four) Times a Day As Needed. 5/3/21  Yes Aylin Gardner APRN   busPIRone (BUSPAR) 5 MG tablet Take 1 tablet by mouth 2 (Two) Times a Day. 4/28/23  Yes Baljeet Vargas MD   Desvenlafaxine Succinate ER (Pristiq) 25 MG tablet sustained-release 24 hour Take 1 tablet by mouth Daily. 6/9/23  Yes Baljeet Vargas MD   hydrocortisone 2.5 % cream  5/18/23  Yes ProviderDevi MD   ketoconazole (NIZORAL) 2 % cream APPLY TO THE AFFECTED AREAS ON FACE ONCE A DAY. 5/19/23  Yes ProviderDevi MD       Allergies:  Patient has no known allergies.    ROS:    Review of Systems   Constitutional: Negative.  Negative for fever.   HENT: Negative.    Eyes: Negative.    Respiratory: Negative.  Negative for shortness of breath.    Cardiovascular: Negative.  Negative for chest pain.   Gastrointestinal: Negative.    Endocrine: Negative.    Genitourinary: Negative.    Skin: Negative.    Neurological: Negative.    Hematological: Negative.    Psychiatric/Behavioral: Negative.      Objective     Blood pressure 128/72, pulse 96, height 175.3 cm (69\"), weight 94.7 kg (208 lb 12.8 oz).    Physical Exam  Vitals and nursing note reviewed.   Constitutional:       Appearance: Normal " appearance.   HENT:      Head: Normocephalic and atraumatic.   Cardiovascular:      Rate and Rhythm: Normal rate and regular rhythm.   Pulmonary:      Effort: Pulmonary effort is normal.      Breath sounds: Normal breath sounds.   Abdominal:      General: Bowel sounds are normal.      Palpations: Abdomen is soft.      Tenderness: There is no abdominal tenderness.   Skin:     General: Skin is warm.      Coloration: Skin is not jaundiced.   Neurological:      General: No focal deficit present.      Mental Status: He is alert.   Psychiatric:         Behavior: Behavior normal.          Assessment & Plan   Diagnoses and all orders for this visit:    1. FERNANDEZ (nonalcoholic steatohepatitis) (Primary)    2. Elevated liver function tests        * Surgery not found *     Diagnosis Plan   1. FERNANDEZ (nonalcoholic steatohepatitis)        2. Elevated liver function tests            Anticipated Surgical Procedure:  No orders of the defined types were placed in this encounter.      The risks, benefits, and alternatives of this procedure have been discussed with the patient or the responsible party- the patient understands and agrees to proceed.

## 2023-07-18 ENCOUNTER — TELEPHONE (OUTPATIENT)
Dept: FAMILY MEDICINE CLINIC | Facility: CLINIC | Age: 23
End: 2023-07-18

## 2023-07-26 ENCOUNTER — OFFICE VISIT (OUTPATIENT)
Dept: FAMILY MEDICINE CLINIC | Facility: CLINIC | Age: 23
End: 2023-07-26
Payer: COMMERCIAL

## 2023-07-26 VITALS
HEART RATE: 98 BPM | OXYGEN SATURATION: 99 % | BODY MASS INDEX: 31.25 KG/M2 | SYSTOLIC BLOOD PRESSURE: 122 MMHG | DIASTOLIC BLOOD PRESSURE: 70 MMHG | HEIGHT: 69 IN | TEMPERATURE: 97.3 F | WEIGHT: 211 LBS

## 2023-07-26 DIAGNOSIS — F41.1 GENERALIZED ANXIETY DISORDER: Primary | ICD-10-CM

## 2023-07-26 DIAGNOSIS — F32.A DEPRESSION, UNSPECIFIED DEPRESSION TYPE: ICD-10-CM

## 2023-07-26 RX ORDER — BUSPIRONE HYDROCHLORIDE 5 MG/1
5 TABLET ORAL 3 TIMES DAILY
Qty: 60 TABLET | Refills: 0 | Status: SHIPPED | OUTPATIENT
Start: 2023-07-26

## 2023-08-03 NOTE — PROGRESS NOTES
Family Medicine Residency  Abel Ku MD    Subjective:     Nigel Escobar is a 22 y.o. male who presents for management of GILSON. At last visit patient was completing Pristiq taper as there was concern for fatty liver disease. Patient states he was not aware that he was supposed to be utilizing Buspar for adjunctive treatment during taper and never picked it up. He is currently not utilizing any medications for anxiety. Symptoms have become mildly worse however he states they are manageable. Does also report concomitant depressive symptoms such as decreased energy, loss of interest, feelings of worthlessness, sleep changes. Denies any SI/HI/AVH.      The following portions of the patient's history were reviewed and updated as appropriate: allergies, current medications, past family history, past medical history, past social history, past surgical history, and problem list.    Past Medical Hx:  Past Medical History:   Diagnosis Date    Acne vulgaris     Allergic     Asthma     Constipation     unspecified    Diarrhea     Gastroesophageal reflux disease     Generalized anxiety disorder     Impacted cerumen     Seborrheic dermatitis     Upper respiratory infection        Past Surgical Hx:  Past Surgical History:   Procedure Laterality Date    WISDOM TOOTH EXTRACTION  04/2019       Current Meds:    Current Outpatient Medications:     albuterol sulfate HFA (Ventolin HFA) 108 (90 Base) MCG/ACT inhaler, Inhale 2 puffs 4 (Four) Times a Day. (Patient taking differently: Inhale 2 puffs 4 (Four) Times a Day As Needed.), Disp: 18 g, Rfl: 2    busPIRone (BUSPAR) 5 MG tablet, Take 1 tablet by mouth 3 (Three) Times a Day., Disp: 60 tablet, Rfl: 0    Desvenlafaxine Succinate ER (Pristiq) 25 MG tablet sustained-release 24 hour, Take 1 tablet by mouth Daily., Disp: 30 tablet, Rfl: 0    hydrocortisone 2.5 % cream, , Disp: , Rfl:     ketoconazole (NIZORAL) 2 % cream, APPLY TO THE AFFECTED AREAS ON FACE ONCE A DAY., Disp:  ", Rfl:     Allergies:  No Known Allergies    Family Hx:  Family History   Problem Relation Age of Onset    Diabetes Mother     Depression Mother     Bipolar disorder Mother     Cancer Maternal Uncle         colon    Cancer Paternal Aunt     Developmental Disability Paternal Aunt     Hypertension Paternal Aunt     Diabetes Father     Heart disease Maternal Grandmother     Cancer Maternal Grandmother     Heart disease Maternal Grandfather         Social History:  Social History     Socioeconomic History    Marital status: Single    Number of children: 0   Tobacco Use    Smoking status: Never    Smokeless tobacco: Never   Vaping Use    Vaping Use: Never used   Substance and Sexual Activity    Alcohol use: Never    Drug use: Never    Sexual activity: Defer       Review of Systems  Review of Systems   Constitutional:  Negative for chills and fever.   HENT:  Negative for congestion, rhinorrhea and sore throat.    Respiratory:  Negative for chest tightness and shortness of breath.    Cardiovascular:  Negative for chest pain and leg swelling.   Gastrointestinal:  Negative for abdominal pain, nausea and vomiting.   Genitourinary: Negative.    Musculoskeletal: Negative.    Skin: Negative.    Neurological:  Negative for dizziness, light-headedness and headaches.   Psychiatric/Behavioral:  Positive for dysphoric mood and sleep disturbance. Negative for agitation, behavioral problems, confusion, self-injury and suicidal ideas. The patient is nervous/anxious.      Objective:     /70   Pulse 98   Temp 97.3 øF (36.3 øC)   Ht 175.3 cm (69\")   Wt 95.7 kg (211 lb)   SpO2 99%   BMI 31.16 kg/mý   Physical Exam  Vitals reviewed.   Constitutional:       General: He is not in acute distress.     Appearance: He is obese.   HENT:      Head: Normocephalic and atraumatic.      Right Ear: External ear normal.      Left Ear: External ear normal.      Nose: Nose normal. No congestion or rhinorrhea.   Eyes:      General: No scleral " "icterus.     Extraocular Movements: Extraocular movements intact.   Cardiovascular:      Rate and Rhythm: Normal rate and regular rhythm.      Pulses: Normal pulses.      Heart sounds: Normal heart sounds.   Pulmonary:      Effort: Pulmonary effort is normal.      Breath sounds: Normal breath sounds.   Abdominal:      General: Bowel sounds are normal.      Palpations: Abdomen is soft.      Tenderness: There is no abdominal tenderness.   Musculoskeletal:      Right lower leg: No edema.      Left lower leg: No edema.   Skin:     General: Skin is warm.   Neurological:      Mental Status: He is alert and oriented to person, place, and time.   Psychiatric:         Mood and Affect: Mood normal.         Behavior: Behavior normal.        Assessment/Plan:     Diagnoses and all orders for this visit:    1. Generalized anxiety disorder (Primary)    2. Depression, unspecified depression type    Other orders  -     busPIRone (BUSPAR) 5 MG tablet; Take 1 tablet by mouth 3 (Three) Times a Day.  Dispense: 60 tablet; Refill: 0    Will have patient utilize Buspar 5mg TID at this time to help with symptoms. Provided crisis hotline number. Discussed going to ED if having any SI/HI/AVH. As patient has both depression/anxiety, has failed treatment with Venlafaxine and desvenlafaxine and would benefit from avoiding side effect of weight gain in the setting of FERNANDEZ, discussed transitioning to newer agent such as Viibryd if able to get approved. Patient is agreeable to plan as discussed.    PHQ 22- Patient did indicate having days of several days of SI on PHQ by circling the corresponding \"1\". Discussed this with patient who states he sometimes feels he would be better off \"not here\". States he would never actually do it and has never had plan but does have these thoughts sometimes. No active SI/HI/AVH.    GILSON 20        Depression screening: Up to date; last screen 7/26/2023     Follow-up:     Return in about 2 weeks (around " 8/9/2023).    Preventative:  Health Maintenance   Topic Date Due    ANNUAL PHYSICAL  08/11/2021    TDAP/TD VACCINES (2 - Td or Tdap) 04/05/2022    COVID-19 Vaccine (4 - Moderna series) 05/20/2022    INFLUENZA VACCINE  10/01/2023    HEPATITIS C SCREENING  Completed    MENINGOCOCCAL VACCINE  Completed    Pneumococcal Vaccine 0-64  Aged Out         Weight  Body mass index is 31.16 kg/mý.      Alcohol use:  reports no history of alcohol use.  Nicotine status  reports that he has never smoked. He has never used smokeless tobacco.     Goals    None         RISK SCORE: 3      This document has been electronically signed by Abel Ku MD on August 9, 2023 16:36 CDT

## 2023-08-16 NOTE — PROGRESS NOTES
I have seen the patient.  I have reviewed the notes, assessments, and/or procedures performed by Abel Ku MD during office visit I concur with her/his documentation and assessment and plan for Nigel Escobar.            This document has been electronically signed by Javad Rajput MD on August 16, 2023 14:07 CDT

## 2023-08-24 ENCOUNTER — OFFICE VISIT (OUTPATIENT)
Dept: FAMILY MEDICINE CLINIC | Facility: CLINIC | Age: 23
End: 2023-08-24
Payer: COMMERCIAL

## 2023-08-24 VITALS
TEMPERATURE: 95.1 F | DIASTOLIC BLOOD PRESSURE: 72 MMHG | WEIGHT: 209.5 LBS | HEART RATE: 91 BPM | BODY MASS INDEX: 31.03 KG/M2 | OXYGEN SATURATION: 99 % | SYSTOLIC BLOOD PRESSURE: 118 MMHG | HEIGHT: 69 IN

## 2023-08-24 DIAGNOSIS — F41.1 GENERALIZED ANXIETY DISORDER: ICD-10-CM

## 2023-08-24 DIAGNOSIS — F33.2 SEVERE EPISODE OF RECURRENT MAJOR DEPRESSIVE DISORDER, WITHOUT PSYCHOTIC FEATURES: Primary | ICD-10-CM

## 2023-08-24 PROCEDURE — 99213 OFFICE O/P EST LOW 20 MIN: CPT

## 2023-08-31 ENCOUNTER — TELEPHONE (OUTPATIENT)
Dept: FAMILY MEDICINE CLINIC | Facility: CLINIC | Age: 23
End: 2023-08-31
Payer: COMMERCIAL

## 2023-08-31 RX ORDER — VILAZODONE HYDROCHLORIDE 20 MG/1
20 TABLET ORAL DAILY
Qty: 30 TABLET | Refills: 0 | Status: SHIPPED | OUTPATIENT
Start: 2023-08-31

## 2023-08-31 RX ORDER — VILAZODONE HYDROCHLORIDE 10 MG/1
10 TABLET ORAL DAILY
Qty: 7 TABLET | Refills: 0 | Status: SHIPPED | OUTPATIENT
Start: 2023-08-31

## 2023-08-31 RX ORDER — VILAZODONE HYDROCHLORIDE 20 MG/1
20 TABLET ORAL DAILY
Qty: 30 TABLET | Refills: 0 | Status: SHIPPED | OUTPATIENT
Start: 2023-08-31 | End: 2023-08-31

## 2023-08-31 RX ORDER — VILAZODONE HYDROCHLORIDE 10 MG/1
10 TABLET ORAL DAILY
Qty: 7 TABLET | Refills: 0 | Status: SHIPPED | OUTPATIENT
Start: 2023-08-31 | End: 2023-08-31

## 2023-08-31 NOTE — TELEPHONE ENCOUNTER
Pt called and stated he isn't going to be getting his new medication Dr. Ku wanted to give him, due to insurance not covering it. Pt would like Dr. Ku to call him back with what the next steps are. Call back at [phone]    Thanks

## 2023-09-04 NOTE — PROGRESS NOTES
Family Medicine Residency  Abel Ku MD    Subjective:     Nigel Escobar is a 22 y.o. male who presents for management of MDD and GILSON. Reports anxiety symptoms are better overall however today depression symptoms are far worse. Reports worsening loss of interest, decreased energy, difficulty sleeping, and feelings of worthlessness. PHQ-9 score 25 and GILSON-7 score 21. States symptoms vary day to day and today is a bad day. Denies any active SI/HI/AVH.    The following portions of the patient's history were reviewed and updated as appropriate: allergies, current medications, past family history, past medical history, past social history, past surgical history, and problem list.    Past Medical Hx:  Past Medical History:   Diagnosis Date    Acne vulgaris     Allergic     Asthma     Constipation     unspecified    Diarrhea     Gastroesophageal reflux disease     Generalized anxiety disorder     Impacted cerumen     Seborrheic dermatitis     Upper respiratory infection        Past Surgical Hx:  Past Surgical History:   Procedure Laterality Date    WISDOM TOOTH EXTRACTION  04/2019       Current Meds:    Current Outpatient Medications:     albuterol sulfate HFA (Ventolin HFA) 108 (90 Base) MCG/ACT inhaler, Inhale 2 puffs 4 (Four) Times a Day. (Patient taking differently: Inhale 2 puffs 4 (Four) Times a Day As Needed.), Disp: 18 g, Rfl: 2    busPIRone (BUSPAR) 5 MG tablet, Take 1 tablet by mouth 3 (Three) Times a Day., Disp: 60 tablet, Rfl: 0    hydrocortisone 2.5 % cream, , Disp: , Rfl:     ketoconazole (NIZORAL) 2 % cream, APPLY TO THE AFFECTED AREAS ON FACE ONCE A DAY., Disp: , Rfl:     vilazodone (VIIBRYD) 10 MG tablet tablet, Take 1 tablet by mouth Daily., Disp: 7 tablet, Rfl: 0    vilazodone (VIIBRYD) 20 MG tablet tablet, Take 1 tablet by mouth Daily. Please take 10mg for first 7 days. After taking 10mg for first 7 days, take 20mg daily, Disp: 30 tablet, Rfl: 0    Allergies:  No Known  "Allergies    Family Hx:  Family History   Problem Relation Age of Onset    Diabetes Mother     Depression Mother     Bipolar disorder Mother     Cancer Maternal Uncle         colon    Cancer Paternal Aunt     Developmental Disability Paternal Aunt     Hypertension Paternal Aunt     Diabetes Father     Heart disease Maternal Grandmother     Cancer Maternal Grandmother     Heart disease Maternal Grandfather         Social History:  Social History     Socioeconomic History    Marital status: Single    Number of children: 0   Tobacco Use    Smoking status: Never    Smokeless tobacco: Never   Vaping Use    Vaping Use: Never used   Substance and Sexual Activity    Alcohol use: Never    Drug use: Never    Sexual activity: Defer       Review of Systems  Review of Systems   Constitutional:  Positive for fatigue. Negative for chills and fever.   HENT:  Negative for congestion, rhinorrhea and sore throat.    Respiratory:  Negative for chest tightness and shortness of breath.    Cardiovascular:  Negative for chest pain and leg swelling.   Gastrointestinal:  Negative for abdominal pain, nausea and vomiting.   Genitourinary: Negative.    Musculoskeletal: Negative.    Neurological:  Negative for dizziness, light-headedness and headaches.   Psychiatric/Behavioral:  Positive for decreased concentration, dysphoric mood and sleep disturbance. Negative for agitation, behavioral problems, confusion, hallucinations, self-injury and suicidal ideas. The patient is nervous/anxious.      Objective:     /72   Pulse 91   Temp 95.1 °F (35.1 °C)   Ht 175.3 cm (69\")   Wt 95 kg (209 lb 8 oz)   SpO2 99%   BMI 30.94 kg/m²   Physical Exam  Vitals reviewed.   Constitutional:       General: He is not in acute distress.     Appearance: He is obese.   HENT:      Head: Normocephalic and atraumatic.      Right Ear: External ear normal.      Left Ear: External ear normal.      Nose: Nose normal. No congestion or rhinorrhea.   Eyes:      General: " "No scleral icterus.     Extraocular Movements: Extraocular movements intact.   Cardiovascular:      Rate and Rhythm: Normal rate and regular rhythm.      Pulses: Normal pulses.      Heart sounds: Normal heart sounds.   Pulmonary:      Effort: Pulmonary effort is normal.      Breath sounds: Normal breath sounds.   Abdominal:      Palpations: Abdomen is soft.      Tenderness: There is no abdominal tenderness.   Musculoskeletal:      Right lower leg: No edema.      Left lower leg: No edema.   Skin:     General: Skin is warm and dry.   Neurological:      Mental Status: He is alert and oriented to person, place, and time.   Psychiatric:         Mood and Affect: Mood normal.         Behavior: Behavior normal.        Assessment/Plan:     Diagnoses and all orders for this visit:    1. Severe episode of recurrent major depressive disorder, without psychotic features (Primary)  -     Ambulatory Referral to [facility] Social Work    2. Generalized anxiety disorder  -     Ambulatory Referral to [facility]    Other orders  -     Discontinue: Vilazodone HCl 10 & 20 MG kit; Take 40 mg by mouth Daily.  Dispense: 30 each; Refill: 0    Plan:    MDD/GILSON  -PHQ 9: 22 --> 25  -GILSON-7: 20 --> 21  -Continue Buspar 5mg TID for anxiety symptoms  -Will trial Viibryd for treatment of depression  -Discussed with patient that he will benefit from CBT in addition to pharmacologic intervention. He is agreeable to this plan. Referral to Cheryl grossman  -Encouraged patient to utilize his support system which include his GF and best friend who he states he can be transparent and openly discuss how he is feeling with.  -Encouraged patient to optimize diet and increase daily exercise for both physical and mental well being  -Discussed patient's response of \"3\" on PHQ-9 on question stating \"thoughts that you would be better off dead/ or of hurting yourself in some way\". Patient states he has thoughts that he would be better off dead sometimes " "however emphasizes that he \"would never actually do it\" and has never had an active plan. He does not meet criteria for involuntary hold at this time as he does not have active SI/HI/AVH.     Rx changes: see a/p  Patient Education: see a/p      Depression screening: Up to date; last screen 8/24/2023     Follow-up:     Return in about 2 weeks (around 9/7/2023) for Recheck.    Preventative:  Health Maintenance   Topic Date Due    ANNUAL PHYSICAL  08/11/2021    TDAP/TD VACCINES (2 - Td or Tdap) 04/05/2022    COVID-19 Vaccine (4 - Moderna series) 05/20/2022    INFLUENZA VACCINE  10/01/2023    BMI FOLLOWUP  06/19/2024    HEPATITIS C SCREENING  Completed    MENINGOCOCCAL VACCINE  Completed    Pneumococcal Vaccine 0-64  Aged Out       Weight  Body mass index is 30.94 kg/m².      Alcohol use:  reports no history of alcohol use.  Nicotine status  reports that he has never smoked. He has never used smokeless tobacco.     Goals    None         RISK SCORE: 3      This document has been electronically signed by Abel Ku MD on September 4, 2023 15:33 CDT    "

## 2023-09-05 ENCOUNTER — TELEPHONE (OUTPATIENT)
Dept: FAMILY MEDICINE CLINIC | Facility: CLINIC | Age: 23
End: 2023-09-05
Payer: COMMERCIAL

## 2023-09-05 NOTE — PROGRESS NOTES
I have reviewed the notes, assessments, and/or procedures performed by Dr Ku, I concur with her/his documentation of Nigel Escobar.     This document has been electronically signed by Shan Hernandez MD on September 5, 2023 11:47 CDT

## 2023-09-11 ENCOUNTER — TELEPHONE (OUTPATIENT)
Dept: FAMILY MEDICINE CLINIC | Facility: CLINIC | Age: 23
End: 2023-09-11
Payer: COMMERCIAL

## 2023-09-11 NOTE — TELEPHONE ENCOUNTER
Attempted to call pt to get scheduled. Got recording. We are sorry you have reached a number that has been disconnected or no longer in service

## 2023-09-28 ENCOUNTER — TELEMEDICINE (OUTPATIENT)
Dept: FAMILY MEDICINE CLINIC | Facility: CLINIC | Age: 23
End: 2023-09-28
Payer: COMMERCIAL

## 2023-09-28 DIAGNOSIS — F41.1 GENERALIZED ANXIETY DISORDER: ICD-10-CM

## 2023-09-28 DIAGNOSIS — F33.41 RECURRENT MAJOR DEPRESSIVE DISORDER, IN PARTIAL REMISSION: Primary | ICD-10-CM

## 2023-09-28 PROBLEM — F32.9 MAJOR DEPRESSIVE DISORDER: Status: ACTIVE | Noted: 2023-08-24

## 2023-09-28 PROCEDURE — 99213 OFFICE O/P EST LOW 20 MIN: CPT

## 2023-09-28 NOTE — PROGRESS NOTES
I have spoken with the patient .  I have reviewed the notes, assessments, and/or procedures performed by Dr. Abel Ku,   I concur with   his  documentation and assessment and plan for Nigel Escobar.          This document has been electronically signed by Hudson Smith MD on September 28, 2023 16:38 CDT

## 2023-09-28 NOTE — PROGRESS NOTES
Family Medicine Residency  Abel Ku MD    Subjective:     You have chosen to receive care through a video visit on 9/28/2023 at 14:50 CDT.   Do you consent to use a [facility] visit for your medical care today? Yes   Length of video call: 5-10.   Patient Site: Home  Provider Site: Clinic      Nigel Escobar is a 23 y.o. male who presents for management of MDD and GILSON. Initiated Viibryd at last visit. Patient reports significant improvement of his mood and it not currently experiencing any depressive symptoms. Denies any medication SE. Denies any SI/HI/AVH. Does endorse anxiety, previously was using Buspar but has not since last visit. States anxiety is present but manageable at this time. Denies any other complaints.    The following portions of the patient's history were reviewed and updated as appropriate: allergies, current medications, past family history, past medical history, past social history, past surgical history, and problem list.    Past Medical Hx:  Past Medical History:   Diagnosis Date    Acne vulgaris     Allergic     Asthma     Constipation     unspecified    Diarrhea     Gastroesophageal reflux disease     Generalized anxiety disorder     Impacted cerumen     Seborrheic dermatitis     Upper respiratory infection        Past Surgical Hx:  Past Surgical History:   Procedure Laterality Date    WISDOM TOOTH EXTRACTION  04/2019       Current Meds:    Current Outpatient Medications:     albuterol sulfate HFA (Ventolin HFA) 108 (90 Base) MCG/ACT inhaler, Inhale 2 puffs 4 (Four) Times a Day. (Patient taking differently: Inhale 2 puffs 4 (Four) Times a Day As Needed.), Disp: 18 g, Rfl: 2    busPIRone (BUSPAR) 5 MG tablet, Take 1 tablet by mouth 3 (Three) Times a Day., Disp: 60 tablet, Rfl: 0    hydrocortisone 2.5 % cream, , Disp: , Rfl:     ketoconazole (NIZORAL) 2 % cream, APPLY TO THE AFFECTED AREAS ON FACE ONCE A DAY., Disp: , Rfl:     vilazodone (VIIBRYD) 10 MG tablet tablet, Take 1 tablet  by mouth Daily., Disp: 7 tablet, Rfl: 0    vilazodone (VIIBRYD) 20 MG tablet tablet, Take 1 tablet by mouth Daily. Please take 10mg for first 7 days. After taking 10mg for first 7 days, take 20mg daily, Disp: 30 tablet, Rfl: 0    Allergies:  No Known Allergies    Family Hx:  Family History   Problem Relation Age of Onset    Diabetes Mother     Depression Mother     Bipolar disorder Mother     Cancer Maternal Uncle         colon    Cancer Paternal Aunt     Developmental Disability Paternal Aunt     Hypertension Paternal Aunt     Diabetes Father     Heart disease Maternal Grandmother     Cancer Maternal Grandmother     Heart disease Maternal Grandfather         Social History:  Social History     Socioeconomic History    Marital status: Single    Number of children: 0   Tobacco Use    Smoking status: Never    Smokeless tobacco: Never   Vaping Use    Vaping Use: Never used   Substance and Sexual Activity    Alcohol use: Never    Drug use: Never    Sexual activity: Defer       Review of Systems  Review of Systems   Constitutional:  Negative for chills and fever.   HENT:  Negative for congestion, rhinorrhea and sore throat.    Respiratory:  Negative for chest tightness and shortness of breath.    Cardiovascular:  Negative for chest pain and leg swelling.   Gastrointestinal:  Negative for abdominal pain, nausea and vomiting.   Genitourinary: Negative.    Neurological:  Negative for dizziness, light-headedness and headaches.   Psychiatric/Behavioral:  Negative for agitation, behavioral problems and confusion.      Objective:     There were no vitals taken for this visit.    Video visit, no physical exam  Assessment/Plan:     Diagnoses and all orders for this visit:    1. Recurrent major depressive disorder, in partial remission (Primary)    2. Generalized anxiety disorder    Plan:  -Continue Viibryd 20mg  -Encouraged patient to continue taking Buspar 5mg TID, states he still has and can contact clinic for  refills  -Discussed social support system, has crisis hotline number, instructed to ED if having any SI/HI/AVH  -F/u in 4 weeks at clinic, administer PHQ/GILSON    Rx changes: see a/p  Patient Education: see a/p       Follow-up:     Return in about 4 weeks (around 10/26/2023) for Recheck; administer PHQ/GILSON, f/u GILSON/MDD.    Preventative:  Health Maintenance   Topic Date Due    ANNUAL PHYSICAL  08/11/2021    TDAP/TD VACCINES (2 - Td or Tdap) 04/05/2022    COVID-19 Vaccine (4 - 2023-24 season) 09/01/2023    INFLUENZA VACCINE  10/01/2023    BMI FOLLOWUP  06/19/2024    HEPATITIS C SCREENING  Completed    Pneumococcal Vaccine 0-64  Aged Out       Weight  There is no height or weight on file to calculate BMI.      Alcohol use:  reports no history of alcohol use.  Nicotine status  reports that he has never smoked. He has never used smokeless tobacco.     Goals    None         RISK SCORE: 3      This document has been electronically signed by Abel Ku MD on September 28, 2023 14:50 CDT